# Patient Record
Sex: FEMALE | Race: WHITE
[De-identification: names, ages, dates, MRNs, and addresses within clinical notes are randomized per-mention and may not be internally consistent; named-entity substitution may affect disease eponyms.]

---

## 2020-06-04 ENCOUNTER — ERX REFILL RESPONSE (OUTPATIENT)
Age: 80
End: 2020-06-04

## 2020-06-04 RX ORDER — SUCRALFATE 1 G/1
TAKE ONE TABLET BY MOUTH EVERY NIGHT AT BEDTIME TABLET ORAL
Qty: 90 | Refills: 0

## 2020-06-24 ENCOUNTER — OFFICE VISIT (OUTPATIENT)
Dept: URBAN - METROPOLITAN AREA CLINIC 115 | Facility: CLINIC | Age: 80
End: 2020-06-24

## 2020-07-17 ENCOUNTER — TELEPHONE ENCOUNTER (OUTPATIENT)
Dept: URBAN - METROPOLITAN AREA CLINIC 92 | Facility: CLINIC | Age: 80
End: 2020-07-17

## 2020-07-17 RX ORDER — OMEPRAZOLE 40 MG/1
1 CAPSULE 30 MINUTES BEFORE MORNING MEAL CAPSULE, DELAYED RELEASE ORAL ONCE A DAY
Qty: 90 | Refills: 1 | OUTPATIENT
Start: 2020-07-17

## 2020-09-08 ENCOUNTER — ERX REFILL RESPONSE (OUTPATIENT)
Age: 80
End: 2020-09-08

## 2020-09-08 RX ORDER — SUCRALFATE 1 G/1
TAKE ONE TABLET BY MOUTH EVERY NIGHT AT BEDTIME TABLET ORAL
Qty: 90 | Refills: 0

## 2020-09-14 ENCOUNTER — OFFICE VISIT (OUTPATIENT)
Dept: URBAN - METROPOLITAN AREA CLINIC 115 | Facility: CLINIC | Age: 80
End: 2020-09-14

## 2020-10-26 ENCOUNTER — OFFICE VISIT (OUTPATIENT)
Dept: URBAN - METROPOLITAN AREA CLINIC 115 | Facility: CLINIC | Age: 80
End: 2020-10-26

## 2020-10-29 ENCOUNTER — OFFICE VISIT (OUTPATIENT)
Dept: URBAN - METROPOLITAN AREA CLINIC 115 | Facility: CLINIC | Age: 80
End: 2020-10-29
Payer: MEDICARE

## 2020-10-29 ENCOUNTER — WEB ENCOUNTER (OUTPATIENT)
Dept: URBAN - METROPOLITAN AREA CLINIC 115 | Facility: CLINIC | Age: 80
End: 2020-10-29

## 2020-10-29 DIAGNOSIS — R13.10 DYSPHAGIA: ICD-10-CM

## 2020-10-29 DIAGNOSIS — K31.89 INTESTINAL METAPLASIA OF GASTRIC MUCOSA: ICD-10-CM

## 2020-10-29 DIAGNOSIS — K21.9 GERD (GASTROESOPHAGEAL REFLUX DISEASE): ICD-10-CM

## 2020-10-29 DIAGNOSIS — R49.0 HOARSENESS OF VOICE: ICD-10-CM

## 2020-10-29 DIAGNOSIS — Z86.010 PERSONAL HISTORY OF COLON POLYPS: ICD-10-CM

## 2020-10-29 DIAGNOSIS — K59.00 CONSTIPATION: ICD-10-CM

## 2020-10-29 PROCEDURE — G8427 DOCREV CUR MEDS BY ELIG CLIN: HCPCS | Performed by: INTERNAL MEDICINE

## 2020-10-29 PROCEDURE — 99214 OFFICE O/P EST MOD 30 MIN: CPT | Performed by: INTERNAL MEDICINE

## 2020-10-29 PROCEDURE — G8418 CALC BMI BLW LOW PARAM F/U: HCPCS | Performed by: INTERNAL MEDICINE

## 2020-10-29 PROCEDURE — 1036F TOBACCO NON-USER: CPT | Performed by: INTERNAL MEDICINE

## 2020-10-29 RX ORDER — SUCRALFATE 1 G/1
TAKE ONE TABLET BY MOUTH EVERY NIGHT AT BEDTIME TABLET ORAL
Qty: 90 | Refills: 0 | Status: ACTIVE | COMMUNITY

## 2020-10-29 RX ORDER — TRAMADOL HYDROCHLORIDE 50 MG/1
TABLET ORAL
Qty: 0 | Refills: 0 | Status: ACTIVE | COMMUNITY
Start: 1900-01-01

## 2020-10-29 RX ORDER — ACETAMINOPHEN 325 MG/1
TAKE 1 TABLET (325 MG) BY ORAL ROUTE EVERY 4 HOURS AS NEEDED TABLET, FILM COATED ORAL
Qty: 0 | Refills: 0 | Status: ACTIVE | COMMUNITY
Start: 1900-01-01

## 2020-10-29 RX ORDER — ATORVASTATIN CALCIUM 10 MG/1
TAKE 1 TABLET (10 MG) BY ORAL ROUTE ONCE DAILY TABLET, FILM COATED ORAL 1
Qty: 0 | Refills: 0 | Status: ACTIVE | COMMUNITY
Start: 1900-01-01

## 2020-10-29 RX ORDER — OMEPRAZOLE 40 MG/1
CAPSULE, DELAYED RELEASE PELLETS ORAL
Qty: 0 | Refills: 0 | Status: ACTIVE | COMMUNITY
Start: 1900-01-01

## 2020-10-29 RX ORDER — ACETAMINOPHEN ER 650 MG TABLET,EXTENDED RELEASE 650 MG
TABLET, EXTENDED RELEASE ORAL
Qty: 0 | Refills: 0 | Status: ACTIVE | COMMUNITY
Start: 1900-01-01

## 2020-10-29 RX ORDER — OMEPRAZOLE 40 MG/1
1 CAPSULE 30 MINUTES BEFORE MORNING MEAL CAPSULE, DELAYED RELEASE ORAL ONCE A DAY
Qty: 90 | Refills: 1 | Status: ACTIVE | COMMUNITY
Start: 2020-07-17

## 2020-10-29 NOTE — HPI-TODAY'S VISIT:
10/29/2020 Patient presents for a follow up office visit. Patient states she has been doing better, with occasional symptoms of GERD. She has intermittent symptoms of nausea in the morning. She did not undergo ENT evaluation of vocal cords. Patient states she has been avoiding chocolate. She is due for an EGD for surveillance of metaplasia.

## 2020-10-29 NOTE — PHYSICAL EXAM HENT:
Head, normocephalic, atraumatic, Face, Face within normal limits, Ears, External ears within normal limits, Nose/Nasopharynx, External nose  normal appearance, nares patent, no nasal discharge, Mouth and Throat, Oral cavity appearance normal, Breath odor normal, Lips, Appearance normal
SEA Alvarez RN

## 2020-10-29 NOTE — HPI-OTHER HISTORIES
This is a scheduled follow-up appointment for this patient, a 79 year old /White female, after a previous visit approximately 8 months ago, for a follow up of colon polyps. The patient is due for repeat colonoscopy for polyp surveillance.  There is no recent history of rectal bleeding. The patient has no pertinent additional complaints of abdominal pain, constipation, diarrhea, and weight loss. The patient had a colonoscopy which was performed 5 months ago. The colonoscopy showed colon polyps and colon polyps and 2.5 cm distal rectal polyp, further removed by Dr. Zavaleta 6/1/2015.. The polyps were located in the rectum. 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  The patient has had the following imaging: no recent imaging has been performed.     08/16/2017 Patient complains of dysphagia with food getting stuck in her chest and nausea. She is currently on Nexium and Alkaseltzer. She also complains of bloating and occasional constipation for which she takes Dulcolax prn. Denies NSAID use. Her last colonoscopy was in 2015 with a large 25mm polyp at the time which needed surgical intervention for removal at the time.  09/14/2017 EGD done on 09/01/17 showed esophagitis, schatzki's ring and gastritis. Status post dilatation. No H. pyori noted. Colonoscopy done on 09/01/17 showed diverticulosis. No polyps noted. She complains of acid reflux with occasional nausea for which she takes Nexium. Constipation improved.  12/05/2018 Patient does not feel that food digests well, admits to regurgitation. States she's currently on Nexium. Patient takes Zantac at night for symptoms of nausea. States symptoms are intermittent, Admits to excessive belching. Admits to bloating and nausea, symptoms improved with Zantac.  10/30/2019 Patient states she had a flare up of nausea, vomiting, low grade fever 3 weeks ago, for no reason. She states symptoms lasted for the day and the next day she was fine. She denies any constipation. She complains of occasional dysphagia for solids, with pasta or rice. She is currently on Nexium. She complains of excessive hiccups. She denies any NSAIDS use. She is currently only taking tylenol for arthritis. Patient states she takes vitamin D supplement. No weight loss.  02/12/2020 EGD done on 11/22/2019 showed gastritis, esophagitis and widely patemt Schatzki's ring. Biopsies were benign, showed intestinal metaplasia in the gastric antrum, no H pylori bacteria. Patient complains of hoarseness of voice. She states she sees an ENT physician to have her ears cleaned, but has not seen her for symptoms of hoarseness. She does not eat late at night. She has occasional glass of wine at night. She is currently on omeprazole. She does report feeling better on omeprazole. Previous colonoscopy done in 2017, showed no polyps. Denies any melena or hematochezia.

## 2020-10-30 ENCOUNTER — TELEPHONE ENCOUNTER (OUTPATIENT)
Dept: URBAN - METROPOLITAN AREA CLINIC 115 | Facility: CLINIC | Age: 80
End: 2020-10-30

## 2020-12-07 ENCOUNTER — OFFICE VISIT (OUTPATIENT)
Dept: URBAN - METROPOLITAN AREA SURGERY CENTER 13 | Facility: SURGERY CENTER | Age: 80
End: 2020-12-07
Payer: MEDICARE

## 2020-12-07 ENCOUNTER — CLAIMS CREATED FROM THE CLAIM WINDOW (OUTPATIENT)
Dept: URBAN - METROPOLITAN AREA CLINIC 4 | Facility: CLINIC | Age: 80
End: 2020-12-07
Payer: MEDICARE

## 2020-12-07 DIAGNOSIS — T47.8X5A ADVERSE EFFECT OF OTHER AGENTS PRIMARILY AFFECTING GASTROINTESTINAL SYSTEM, INITIAL ENCOUNTER: ICD-10-CM

## 2020-12-07 DIAGNOSIS — K29.60 OTHER GASTRITIS WITHOUT BLEEDING: ICD-10-CM

## 2020-12-07 DIAGNOSIS — K31.89 ACQUIRED DEFORMITY OF DUODENUM: ICD-10-CM

## 2020-12-07 DIAGNOSIS — R13.19 CERVICAL DYSPHAGIA: ICD-10-CM

## 2020-12-07 DIAGNOSIS — K21.9 ACID REFLUX: ICD-10-CM

## 2020-12-07 DIAGNOSIS — R10.13 ABDOMINAL DISCOMFORT, EPIGASTRIC: ICD-10-CM

## 2020-12-07 DIAGNOSIS — K22.2 ACQUIRED ESOPHAGEAL RING: ICD-10-CM

## 2020-12-07 DIAGNOSIS — K21.00 GASTRO-ESOPHAGEAL REFLUX DISEASE WITH ESOPHAGITIS, WITHOUT BLEEDING: ICD-10-CM

## 2020-12-07 PROCEDURE — 43248 EGD GUIDE WIRE INSERTION: CPT | Performed by: INTERNAL MEDICINE

## 2020-12-07 PROCEDURE — 88312 SPECIAL STAINS GROUP 1: CPT | Performed by: PATHOLOGY

## 2020-12-07 PROCEDURE — 43239 EGD BIOPSY SINGLE/MULTIPLE: CPT | Performed by: INTERNAL MEDICINE

## 2020-12-07 PROCEDURE — G8907 PT DOC NO EVENTS ON DISCHARG: HCPCS | Performed by: INTERNAL MEDICINE

## 2020-12-07 PROCEDURE — 88305 TISSUE EXAM BY PATHOLOGIST: CPT | Performed by: PATHOLOGY

## 2020-12-07 RX ORDER — ACETAMINOPHEN ER 650 MG TABLET,EXTENDED RELEASE 650 MG
TABLET, EXTENDED RELEASE ORAL
Qty: 0 | Refills: 0 | Status: ACTIVE | COMMUNITY
Start: 1900-01-01

## 2020-12-07 RX ORDER — ATORVASTATIN CALCIUM 10 MG/1
TAKE 1 TABLET (10 MG) BY ORAL ROUTE ONCE DAILY TABLET, FILM COATED ORAL 1
Qty: 0 | Refills: 0 | Status: ACTIVE | COMMUNITY
Start: 1900-01-01

## 2020-12-07 RX ORDER — OMEPRAZOLE 40 MG/1
CAPSULE, DELAYED RELEASE PELLETS ORAL
Qty: 0 | Refills: 0 | Status: ACTIVE | COMMUNITY
Start: 1900-01-01

## 2020-12-07 RX ORDER — OMEPRAZOLE 40 MG/1
1 CAPSULE 30 MINUTES BEFORE MORNING MEAL CAPSULE, DELAYED RELEASE ORAL ONCE A DAY
Qty: 90 | Refills: 1 | Status: ACTIVE | COMMUNITY
Start: 2020-07-17

## 2020-12-07 RX ORDER — ACETAMINOPHEN 325 MG/1
TAKE 1 TABLET (325 MG) BY ORAL ROUTE EVERY 4 HOURS AS NEEDED TABLET, FILM COATED ORAL
Qty: 0 | Refills: 0 | Status: ACTIVE | COMMUNITY
Start: 1900-01-01

## 2020-12-07 RX ORDER — SUCRALFATE 1 G/1
TAKE ONE TABLET BY MOUTH EVERY NIGHT AT BEDTIME TABLET ORAL
Qty: 90 | Refills: 0 | Status: ACTIVE | COMMUNITY

## 2020-12-07 RX ORDER — TRAMADOL HYDROCHLORIDE 50 MG/1
TABLET ORAL
Qty: 0 | Refills: 0 | Status: ACTIVE | COMMUNITY
Start: 1900-01-01

## 2020-12-08 ENCOUNTER — TELEPHONE ENCOUNTER (OUTPATIENT)
Dept: URBAN - METROPOLITAN AREA CLINIC 82 | Facility: CLINIC | Age: 80
End: 2020-12-08

## 2020-12-17 ENCOUNTER — OFFICE VISIT (OUTPATIENT)
Dept: URBAN - METROPOLITAN AREA CLINIC 115 | Facility: CLINIC | Age: 80
End: 2020-12-17
Payer: MEDICARE

## 2020-12-17 DIAGNOSIS — K31.89 INTESTINAL METAPLASIA OF GASTRIC MUCOSA: ICD-10-CM

## 2020-12-17 DIAGNOSIS — Z86.010 PERSONAL HISTORY OF COLON POLYPS: ICD-10-CM

## 2020-12-17 DIAGNOSIS — R49.0 HOARSENESS OF VOICE: ICD-10-CM

## 2020-12-17 DIAGNOSIS — R13.10 DYSPHAGIA: ICD-10-CM

## 2020-12-17 DIAGNOSIS — K21.9 GERD (GASTROESOPHAGEAL REFLUX DISEASE): ICD-10-CM

## 2020-12-17 DIAGNOSIS — K59.00 CONSTIPATION: ICD-10-CM

## 2020-12-17 PROCEDURE — G8418 CALC BMI BLW LOW PARAM F/U: HCPCS | Performed by: INTERNAL MEDICINE

## 2020-12-17 PROCEDURE — 99214 OFFICE O/P EST MOD 30 MIN: CPT | Performed by: INTERNAL MEDICINE

## 2020-12-17 PROCEDURE — G9903 PT SCRN TBCO ID AS NON USER: HCPCS | Performed by: INTERNAL MEDICINE

## 2020-12-17 PROCEDURE — G8427 DOCREV CUR MEDS BY ELIG CLIN: HCPCS | Performed by: INTERNAL MEDICINE

## 2020-12-17 RX ORDER — ATORVASTATIN CALCIUM 10 MG/1
TAKE 1 TABLET (10 MG) BY ORAL ROUTE ONCE DAILY TABLET, FILM COATED ORAL 1
Qty: 0 | Refills: 0 | COMMUNITY
Start: 1900-01-01

## 2020-12-17 RX ORDER — OMEPRAZOLE 40 MG/1
1 CAPSULE 30 MINUTES BEFORE MORNING MEAL CAPSULE, DELAYED RELEASE ORAL ONCE A DAY
Qty: 90 | Refills: 1 | COMMUNITY
Start: 2020-07-17

## 2020-12-17 RX ORDER — OMEPRAZOLE 40 MG/1
CAPSULE, DELAYED RELEASE PELLETS ORAL
Qty: 0 | Refills: 0 | COMMUNITY
Start: 1900-01-01

## 2020-12-17 RX ORDER — TRAMADOL HYDROCHLORIDE 50 MG/1
TABLET ORAL
Qty: 0 | Refills: 0 | COMMUNITY
Start: 1900-01-01

## 2020-12-17 RX ORDER — ACETAMINOPHEN 325 MG/1
TAKE 1 TABLET (325 MG) BY ORAL ROUTE EVERY 4 HOURS AS NEEDED TABLET, FILM COATED ORAL
Qty: 0 | Refills: 0 | COMMUNITY
Start: 1900-01-01

## 2020-12-17 RX ORDER — SUCRALFATE 1 G
1 TABLET ON AN EMPTY STOMACH TABLET ORAL TWICE A DAY
Qty: 180 TABLET | Refills: 1 | OUTPATIENT
Start: 2020-12-17 | End: 2021-06-15

## 2020-12-17 RX ORDER — ACETAMINOPHEN ER 650 MG TABLET,EXTENDED RELEASE 650 MG
TABLET, EXTENDED RELEASE ORAL
Qty: 0 | Refills: 0 | COMMUNITY
Start: 1900-01-01

## 2020-12-17 RX ORDER — OMEPRAZOLE 40 MG/1
1 CAPSULE 30 MINUTES BEFORE MORNING MEAL CAPSULE, DELAYED RELEASE ORAL ONCE A DAY
Qty: 90 | Refills: 1 | OUTPATIENT
Start: 2020-12-17

## 2020-12-17 RX ORDER — SUCRALFATE 1 G/1
TAKE ONE TABLET BY MOUTH EVERY NIGHT AT BEDTIME TABLET ORAL
Qty: 90 | Refills: 0 | COMMUNITY

## 2020-12-17 NOTE — HPI-TODAY'S VISIT:
10/29/2020 Patient presents for a follow up office visit. Patient states she has been doing better, with occasional symptoms of GERD. She has intermittent symptoms of nausea in the morning. She did not undergo ENT evaluation of vocal cords. Patient states she has been avoiding chocolate. She is due for an EGD for surveillance of metaplasia.  12/17/2020 Patient presents for a follow up office visit. EGD done on 12/7/2020 showed gastritis, esophagitis, and Schaztki's ring s/p dilatation performed. BIopsy showed gastropathy no metaplasia and no H pylori, no Barretts noted.  Patient states she is doing well. She states after dilatation she was sore for about 3 days, but she no longer has any issues with dysphagia. She did see the ENT physician, no pathology noted. She continues to take omeprazole and carafate.

## 2021-01-07 ENCOUNTER — OFFICE VISIT (OUTPATIENT)
Dept: URBAN - METROPOLITAN AREA CLINIC 115 | Facility: CLINIC | Age: 81
End: 2021-01-07

## 2021-02-19 ENCOUNTER — TELEPHONE ENCOUNTER (OUTPATIENT)
Dept: URBAN - METROPOLITAN AREA CLINIC 23 | Facility: CLINIC | Age: 81
End: 2021-02-19

## 2021-06-24 ENCOUNTER — OFFICE VISIT (OUTPATIENT)
Dept: URBAN - METROPOLITAN AREA CLINIC 115 | Facility: CLINIC | Age: 81
End: 2021-06-24
Payer: MEDICARE

## 2021-06-24 ENCOUNTER — WEB ENCOUNTER (OUTPATIENT)
Dept: URBAN - METROPOLITAN AREA CLINIC 115 | Facility: CLINIC | Age: 81
End: 2021-06-24

## 2021-06-24 ENCOUNTER — LAB OUTSIDE AN ENCOUNTER (OUTPATIENT)
Dept: URBAN - METROPOLITAN AREA CLINIC 115 | Facility: CLINIC | Age: 81
End: 2021-06-24

## 2021-06-24 DIAGNOSIS — R13.10 DYSPHAGIA: ICD-10-CM

## 2021-06-24 DIAGNOSIS — K21.9 GERD (GASTROESOPHAGEAL REFLUX DISEASE): ICD-10-CM

## 2021-06-24 DIAGNOSIS — R49.0 HOARSENESS OF VOICE: ICD-10-CM

## 2021-06-24 DIAGNOSIS — K59.00 CONSTIPATION: ICD-10-CM

## 2021-06-24 DIAGNOSIS — Z86.010 PERSONAL HISTORY OF COLON POLYPS: ICD-10-CM

## 2021-06-24 DIAGNOSIS — K31.89 INTESTINAL METAPLASIA OF GASTRIC MUCOSA: ICD-10-CM

## 2021-06-24 PROBLEM — 428283002 HISTORY OF POLYP OF COLON: Status: ACTIVE | Noted: 2020-10-29

## 2021-06-24 PROBLEM — 14760008 CONSTIPATION: Status: ACTIVE | Noted: 2020-10-29

## 2021-06-24 PROBLEM — 50219008 VOICE HOARSENESS: Status: ACTIVE | Noted: 2020-10-29

## 2021-06-24 PROCEDURE — G8427 DOCREV CUR MEDS BY ELIG CLIN: HCPCS | Performed by: INTERNAL MEDICINE

## 2021-06-24 PROCEDURE — G8420 CALC BMI NORM PARAMETERS: HCPCS | Performed by: INTERNAL MEDICINE

## 2021-06-24 PROCEDURE — 99214 OFFICE O/P EST MOD 30 MIN: CPT | Performed by: INTERNAL MEDICINE

## 2021-06-24 PROCEDURE — G9903 PT SCRN TBCO ID AS NON USER: HCPCS | Performed by: INTERNAL MEDICINE

## 2021-06-24 RX ORDER — TRAMADOL HYDROCHLORIDE 50 MG/1
TABLET ORAL
Qty: 0 | Refills: 0 | Status: ACTIVE | COMMUNITY
Start: 1900-01-01

## 2021-06-24 RX ORDER — SUCRALFATE 1 G/1
TAKE ONE TABLET BY MOUTH EVERY NIGHT AT BEDTIME TABLET ORAL
Qty: 90 | Refills: 0 | Status: ACTIVE | COMMUNITY

## 2021-06-24 RX ORDER — ACETAMINOPHEN 325 MG/1
TAKE 1 TABLET (325 MG) BY ORAL ROUTE EVERY 4 HOURS AS NEEDED TABLET, FILM COATED ORAL
Qty: 0 | Refills: 0 | Status: ACTIVE | COMMUNITY
Start: 1900-01-01

## 2021-06-24 RX ORDER — ACETAMINOPHEN ER 650 MG TABLET,EXTENDED RELEASE 650 MG
TABLET, EXTENDED RELEASE ORAL
Qty: 0 | Refills: 0 | Status: ACTIVE | COMMUNITY
Start: 1900-01-01

## 2021-06-24 RX ORDER — ATORVASTATIN CALCIUM 10 MG/1
TAKE 1 TABLET (10 MG) BY ORAL ROUTE ONCE DAILY TABLET, FILM COATED ORAL 1
Qty: 0 | Refills: 0 | Status: ACTIVE | COMMUNITY
Start: 1900-01-01

## 2021-06-24 RX ORDER — OMEPRAZOLE 40 MG/1
CAPSULE, DELAYED RELEASE PELLETS ORAL
Qty: 0 | Refills: 0 | Status: ACTIVE | COMMUNITY
Start: 1900-01-01

## 2021-06-24 RX ORDER — OMEPRAZOLE 40 MG/1
1 CAPSULE 30 MINUTES BEFORE MORNING MEAL CAPSULE, DELAYED RELEASE ORAL ONCE A DAY
Qty: 90 | Refills: 1 | Status: ACTIVE | COMMUNITY
Start: 2020-12-17

## 2021-06-24 RX ORDER — OMEPRAZOLE 40 MG/1
1 CAPSULE 30 MINUTES BEFORE MORNING MEAL CAPSULE, DELAYED RELEASE ORAL ONCE A DAY
Qty: 90 | Refills: 1 | Status: ACTIVE | COMMUNITY
Start: 2020-07-17

## 2021-06-24 NOTE — HPI-TODAY'S VISIT:
10/29/2020 Patient presents for a follow up office visit. Patient states she has been doing better, with occasional symptoms of GERD. She has intermittent symptoms of nausea in the morning. She did not undergo ENT evaluation of vocal cords. Patient states she has been avoiding chocolate. She is due for an EGD for surveillance of metaplasia.  12/17/2020 Patient presents for a follow up office visit. EGD done on 12/7/2020 showed gastritis, esophagitis, and Schaztki's ring s/p dilatation performed. Biopsy showed gastropathy no metaplasia and no H pylori, no Barretts noted.  Patient states she is doing well. She states after dilatation she was sore for about 3 days, but she no longer has any issues with dysphagia. She did see the ENT physician, no pathology noted. She continues to take omeprazole and carafate.  06/24/2021 Patient presents for a follow up office visit. Her acid reflux has improved with only occasional symptoms depending on her diet. Rice and spaghetti worsen her reflux and dysphagia. She currently takes omeprazole 40mg (in the morning) and carafate (twice a day). She had EGD with dilation done last year 12/2020. Overall she has been feeling well. Patient is fully vaccinated for Covid.

## 2021-07-26 ENCOUNTER — OFFICE VISIT (OUTPATIENT)
Dept: URBAN - METROPOLITAN AREA SURGERY CENTER 13 | Facility: SURGERY CENTER | Age: 81
End: 2021-07-26

## 2021-09-17 ENCOUNTER — TELEPHONE ENCOUNTER (OUTPATIENT)
Dept: URBAN - METROPOLITAN AREA CLINIC 115 | Facility: CLINIC | Age: 81
End: 2021-09-17

## 2021-09-17 RX ORDER — OMEPRAZOLE 40 MG/1
1 CAPSULE 30 MINUTES BEFORE MORNING MEAL CAPSULE, DELAYED RELEASE ORAL ONCE A DAY
Qty: 90 | Refills: 0
Start: 2020-12-17

## 2021-12-17 ENCOUNTER — TELEPHONE ENCOUNTER (OUTPATIENT)
Dept: URBAN - METROPOLITAN AREA CLINIC 115 | Facility: CLINIC | Age: 81
End: 2021-12-17

## 2021-12-17 RX ORDER — OMEPRAZOLE 40 MG/1
1 CAPSULE 30 MINUTES BEFORE MORNING MEAL CAPSULE, DELAYED RELEASE ORAL ONCE A DAY
Qty: 90 | Refills: 0
Start: 2020-12-17

## 2021-12-23 ENCOUNTER — OFFICE VISIT (OUTPATIENT)
Dept: URBAN - METROPOLITAN AREA CLINIC 115 | Facility: CLINIC | Age: 81
End: 2021-12-23

## 2022-06-16 ENCOUNTER — CLAIMS CREATED FROM THE CLAIM WINDOW (OUTPATIENT)
Dept: URBAN - METROPOLITAN AREA CLINIC 115 | Facility: CLINIC | Age: 82
End: 2022-06-16
Payer: MEDICARE

## 2022-06-16 ENCOUNTER — WEB ENCOUNTER (OUTPATIENT)
Dept: URBAN - METROPOLITAN AREA CLINIC 115 | Facility: CLINIC | Age: 82
End: 2022-06-16

## 2022-06-16 VITALS
DIASTOLIC BLOOD PRESSURE: 70 MMHG | BODY MASS INDEX: 21.16 KG/M2 | HEIGHT: 65 IN | HEART RATE: 62 BPM | RESPIRATION RATE: 18 BRPM | SYSTOLIC BLOOD PRESSURE: 129 MMHG | TEMPERATURE: 97.1 F | WEIGHT: 127 LBS

## 2022-06-16 DIAGNOSIS — K58.0 IRRITABLE BOWEL SYNDROME WITH DIARRHEA: ICD-10-CM

## 2022-06-16 DIAGNOSIS — K21.9 GERD (GASTROESOPHAGEAL REFLUX DISEASE): ICD-10-CM

## 2022-06-16 PROCEDURE — G9903 PT SCRN TBCO ID AS NON USER: HCPCS | Performed by: INTERNAL MEDICINE

## 2022-06-16 PROCEDURE — G8427 DOCREV CUR MEDS BY ELIG CLIN: HCPCS | Performed by: INTERNAL MEDICINE

## 2022-06-16 PROCEDURE — G8418 CALC BMI BLW LOW PARAM F/U: HCPCS | Performed by: INTERNAL MEDICINE

## 2022-06-16 PROCEDURE — 99214 OFFICE O/P EST MOD 30 MIN: CPT | Performed by: INTERNAL MEDICINE

## 2022-06-16 RX ORDER — CHOLESTYRAMINE 4 G/9G
1 PACKET MIXED WITH WATER OR NON-CARBONATED DRINK POWDER, FOR SUSPENSION ORAL TWICE A DAY
Qty: 60 | Refills: 1 | OUTPATIENT
Start: 2022-06-16

## 2022-06-16 RX ORDER — OMEPRAZOLE 40 MG/1
1 CAPSULE 30 MINUTES BEFORE MORNING MEAL CAPSULE, DELAYED RELEASE ORAL ONCE A DAY
Qty: 90 | Refills: 1 | Status: ACTIVE | COMMUNITY
Start: 2020-07-17

## 2022-06-16 RX ORDER — OMEPRAZOLE 40 MG/1
CAPSULE, DELAYED RELEASE PELLETS ORAL
Qty: 0 | Refills: 0 | Status: ACTIVE | COMMUNITY
Start: 1900-01-01

## 2022-06-16 RX ORDER — POLYETHYLENE GLYCOL-3350 AND ELECTROLYTES WITH FLAVOR PACK 240; 5.84; 2.98; 6.72; 22.72 G/278.26G; G/278.26G; G/278.26G; G/278.26G; G/278.26G
AS DIRECTED POWDER, FOR SOLUTION ORAL
Qty: 1 | Refills: 0 | OUTPATIENT
Start: 2022-06-16 | End: 2022-06-17

## 2022-06-16 RX ORDER — OMEPRAZOLE 40 MG/1
1 CAPSULE 30 MINUTES BEFORE MORNING MEAL CAPSULE, DELAYED RELEASE ORAL ONCE A DAY
Qty: 90 | Refills: 1 | OUTPATIENT
Start: 2022-06-16

## 2022-06-16 RX ORDER — TRAMADOL HYDROCHLORIDE 50 MG/1
TABLET ORAL
Qty: 0 | Refills: 0 | Status: ACTIVE | COMMUNITY
Start: 1900-01-01

## 2022-06-16 RX ORDER — ACETAMINOPHEN ER 650 MG TABLET,EXTENDED RELEASE 650 MG
TABLET, EXTENDED RELEASE ORAL
Qty: 0 | Refills: 0 | Status: ACTIVE | COMMUNITY
Start: 1900-01-01

## 2022-06-16 RX ORDER — SUCRALFATE 1 G/1
TAKE ONE TABLET BY MOUTH EVERY NIGHT AT BEDTIME TABLET ORAL
Qty: 90 | Refills: 0 | Status: ACTIVE | COMMUNITY

## 2022-06-16 RX ORDER — OMEPRAZOLE 40 MG/1
1 CAPSULE 30 MINUTES BEFORE MORNING MEAL CAPSULE, DELAYED RELEASE ORAL ONCE A DAY
Qty: 90 | Refills: 0 | Status: ACTIVE | COMMUNITY
Start: 2020-12-17

## 2022-06-16 RX ORDER — ATORVASTATIN CALCIUM 10 MG/1
TAKE 1 TABLET (10 MG) BY ORAL ROUTE ONCE DAILY TABLET, FILM COATED ORAL 1
Qty: 0 | Refills: 0 | Status: ACTIVE | COMMUNITY
Start: 1900-01-01

## 2022-06-16 RX ORDER — ACETAMINOPHEN 325 MG/1
TAKE 1 TABLET (325 MG) BY ORAL ROUTE EVERY 4 HOURS AS NEEDED TABLET, FILM COATED ORAL
Qty: 0 | Refills: 0 | Status: ACTIVE | COMMUNITY
Start: 1900-01-01

## 2022-06-16 NOTE — HPI-TODAY'S VISIT:
10/29/2020 Patient presents for a follow up office visit. Patient states she has been doing better, with occasional symptoms of GERD. She has intermittent symptoms of nausea in the morning. She did not undergo ENT evaluation of vocal cords. Patient states she has been avoiding chocolate. She is due for an EGD for surveillance of metaplasia.  12/17/2020 Patient presents for a follow up office visit. EGD done on 12/7/2020 showed gastritis, esophagitis, and Schaztki's ring s/p dilatation performed. Biopsy showed gastropathy no metaplasia and no H pylori, no Barretts noted.  Patient states she is doing well. She states after dilatation she was sore for about 3 days, but she no longer has any issues with dysphagia. She did see the ENT physician, no pathology noted. She continues to take omeprazole and carafate.  06/24/2021 Patient presents for a follow up office visit. Her acid reflux has improved with only occasional symptoms depending on her diet. Rice and spaghetti worsen her reflux and dysphagia. She currently takes omeprazole 40mg (in the morning) and carafate (twice a day). She had EGD with dilation done last year 12/2020. Overall she has been feeling well. Patient is fully vaccinated for Covid.  06/16/2022 Patient presents for symptoms of diarrhea and for a colon cancer screening. She states that she has been having three weeks of very bad diarrhea and she has been taking imodium and pepto bismol. Patient also states that she has a little bit of dark stool but no blood in stool. She states that she has been having a lot of gas and some bloating in the lower area of the stomach. Patient has been stressed due to  passing away and she takes melatonin to be able to sleep.

## 2022-06-17 ENCOUNTER — LAB OUTSIDE AN ENCOUNTER (OUTPATIENT)
Dept: URBAN - METROPOLITAN AREA CLINIC 115 | Facility: CLINIC | Age: 82
End: 2022-06-17

## 2022-06-17 ENCOUNTER — TELEPHONE ENCOUNTER (OUTPATIENT)
Dept: URBAN - METROPOLITAN AREA CLINIC 115 | Facility: CLINIC | Age: 82
End: 2022-06-17

## 2022-07-07 ENCOUNTER — OFFICE VISIT (OUTPATIENT)
Dept: URBAN - METROPOLITAN AREA SURGERY CENTER 13 | Facility: SURGERY CENTER | Age: 82
End: 2022-07-07

## 2022-07-14 ENCOUNTER — OFFICE VISIT (OUTPATIENT)
Dept: URBAN - METROPOLITAN AREA CLINIC 115 | Facility: CLINIC | Age: 82
End: 2022-07-14

## 2022-07-14 VITALS — HEIGHT: 65 IN

## 2022-07-14 RX ORDER — TRAMADOL HYDROCHLORIDE 50 MG/1
TABLET ORAL
Qty: 0 | Refills: 0 | Status: ACTIVE | COMMUNITY
Start: 1900-01-01

## 2022-07-14 RX ORDER — ACETAMINOPHEN 325 MG/1
TAKE 1 TABLET (325 MG) BY ORAL ROUTE EVERY 4 HOURS AS NEEDED TABLET, FILM COATED ORAL
Qty: 0 | Refills: 0 | Status: ACTIVE | COMMUNITY
Start: 1900-01-01

## 2022-07-14 RX ORDER — ACETAMINOPHEN ER 650 MG TABLET,EXTENDED RELEASE 650 MG
TABLET, EXTENDED RELEASE ORAL
Qty: 0 | Refills: 0 | Status: ACTIVE | COMMUNITY
Start: 1900-01-01

## 2022-07-14 RX ORDER — SUCRALFATE 1 G/1
TAKE ONE TABLET BY MOUTH EVERY NIGHT AT BEDTIME TABLET ORAL
Qty: 90 | Refills: 0 | Status: ACTIVE | COMMUNITY

## 2022-07-14 RX ORDER — OMEPRAZOLE 40 MG/1
CAPSULE, DELAYED RELEASE PELLETS ORAL
Qty: 0 | Refills: 0 | Status: ACTIVE | COMMUNITY
Start: 1900-01-01

## 2022-07-14 RX ORDER — OMEPRAZOLE 40 MG/1
1 CAPSULE 30 MINUTES BEFORE MORNING MEAL CAPSULE, DELAYED RELEASE ORAL ONCE A DAY
Qty: 90 | Refills: 0 | Status: ACTIVE | COMMUNITY
Start: 2020-12-17

## 2022-07-14 RX ORDER — OMEPRAZOLE 40 MG/1
1 CAPSULE 30 MINUTES BEFORE MORNING MEAL CAPSULE, DELAYED RELEASE ORAL ONCE A DAY
Qty: 90 | Refills: 1 | Status: ACTIVE | COMMUNITY
Start: 2020-07-17

## 2022-07-14 RX ORDER — ATORVASTATIN CALCIUM 10 MG/1
TAKE 1 TABLET (10 MG) BY ORAL ROUTE ONCE DAILY TABLET, FILM COATED ORAL 1
Qty: 0 | Refills: 0 | Status: ACTIVE | COMMUNITY
Start: 1900-01-01

## 2022-07-29 ENCOUNTER — OFFICE VISIT (OUTPATIENT)
Dept: URBAN - METROPOLITAN AREA SURGERY CENTER 13 | Facility: SURGERY CENTER | Age: 82
End: 2022-07-29

## 2022-08-12 ENCOUNTER — OFFICE VISIT (OUTPATIENT)
Dept: URBAN - METROPOLITAN AREA SURGERY CENTER 13 | Facility: SURGERY CENTER | Age: 82
End: 2022-08-12

## 2022-08-26 ENCOUNTER — OFFICE VISIT (OUTPATIENT)
Dept: URBAN - METROPOLITAN AREA SURGERY CENTER 13 | Facility: SURGERY CENTER | Age: 82
End: 2022-08-26
Payer: MEDICARE

## 2022-08-26 ENCOUNTER — CLAIMS CREATED FROM THE CLAIM WINDOW (OUTPATIENT)
Dept: URBAN - METROPOLITAN AREA CLINIC 4 | Facility: CLINIC | Age: 82
End: 2022-08-26
Payer: MEDICARE

## 2022-08-26 DIAGNOSIS — K63.89 OTHER SPECIFIED DISEASES OF INTESTINE: ICD-10-CM

## 2022-08-26 DIAGNOSIS — K29.70 GASTRITIS, UNSPECIFIED, WITHOUT BLEEDING: ICD-10-CM

## 2022-08-26 DIAGNOSIS — K52.832 COLITIS, UNSPEC (558.9): ICD-10-CM

## 2022-08-26 DIAGNOSIS — K52.832 LYMPHOCYTIC COLITIS: ICD-10-CM

## 2022-08-26 DIAGNOSIS — K31.89 ACQUIRED DEFORMITY OF DUODENUM: ICD-10-CM

## 2022-08-26 DIAGNOSIS — R10.13 ABDOMINAL DISCOMFORT, EPIGASTRIC: ICD-10-CM

## 2022-08-26 DIAGNOSIS — K22.89 DILATATION OF ESOPHAGUS: ICD-10-CM

## 2022-08-26 DIAGNOSIS — K62.1 ANAL AND RECTAL POLYP: ICD-10-CM

## 2022-08-26 DIAGNOSIS — K52.9 CHRONIC DIARRHEA: ICD-10-CM

## 2022-08-26 PROCEDURE — 43239 EGD BIOPSY SINGLE/MULTIPLE: CPT | Performed by: INTERNAL MEDICINE

## 2022-08-26 PROCEDURE — 88313 SPECIAL STAINS GROUP 2: CPT | Performed by: PATHOLOGY

## 2022-08-26 PROCEDURE — 88342 IMHCHEM/IMCYTCHM 1ST ANTB: CPT | Performed by: PATHOLOGY

## 2022-08-26 PROCEDURE — G8907 PT DOC NO EVENTS ON DISCHARG: HCPCS | Performed by: INTERNAL MEDICINE

## 2022-08-26 PROCEDURE — 88305 TISSUE EXAM BY PATHOLOGIST: CPT | Performed by: PATHOLOGY

## 2022-08-26 PROCEDURE — 45380 COLONOSCOPY AND BIOPSY: CPT | Performed by: INTERNAL MEDICINE

## 2022-08-26 PROCEDURE — 88312 SPECIAL STAINS GROUP 1: CPT | Performed by: PATHOLOGY

## 2022-08-29 ENCOUNTER — TELEPHONE ENCOUNTER (OUTPATIENT)
Dept: URBAN - METROPOLITAN AREA CLINIC 92 | Facility: CLINIC | Age: 82
End: 2022-08-29

## 2022-09-22 ENCOUNTER — OFFICE VISIT (OUTPATIENT)
Dept: URBAN - METROPOLITAN AREA CLINIC 115 | Facility: CLINIC | Age: 82
End: 2022-09-22
Payer: MEDICARE

## 2022-09-22 VITALS
WEIGHT: 126.4 LBS | DIASTOLIC BLOOD PRESSURE: 72 MMHG | HEIGHT: 65 IN | BODY MASS INDEX: 21.06 KG/M2 | TEMPERATURE: 97.1 F | RESPIRATION RATE: 18 BRPM | HEART RATE: 70 BPM | SYSTOLIC BLOOD PRESSURE: 137 MMHG

## 2022-09-22 DIAGNOSIS — K58.0 IRRITABLE BOWEL SYNDROME WITH DIARRHEA: ICD-10-CM

## 2022-09-22 DIAGNOSIS — K52.832 LYMPHOCYTIC COLITIS: ICD-10-CM

## 2022-09-22 DIAGNOSIS — K21.9 GERD (GASTROESOPHAGEAL REFLUX DISEASE): ICD-10-CM

## 2022-09-22 PROCEDURE — G9903 PT SCRN TBCO ID AS NON USER: HCPCS | Performed by: INTERNAL MEDICINE

## 2022-09-22 PROCEDURE — G8418 CALC BMI BLW LOW PARAM F/U: HCPCS | Performed by: INTERNAL MEDICINE

## 2022-09-22 PROCEDURE — 99214 OFFICE O/P EST MOD 30 MIN: CPT | Performed by: INTERNAL MEDICINE

## 2022-09-22 PROCEDURE — G8427 DOCREV CUR MEDS BY ELIG CLIN: HCPCS | Performed by: INTERNAL MEDICINE

## 2022-09-22 RX ORDER — OMEPRAZOLE 40 MG/1
1 CAPSULE 30 MINUTES BEFORE MORNING MEAL CAPSULE, DELAYED RELEASE ORAL ONCE A DAY
Qty: 90 | Refills: 1 | Status: ACTIVE | COMMUNITY
Start: 2020-07-17

## 2022-09-22 RX ORDER — BUDESONIDE 3 MG/1
1 CAPSULES CAPSULE ORAL THREE TIMES A DAY
Qty: 270 CAPSULE | Refills: 0 | OUTPATIENT
Start: 2022-09-22 | End: 2022-12-20

## 2022-09-22 RX ORDER — TRAMADOL HYDROCHLORIDE 50 MG/1
TABLET ORAL
Qty: 0 | Refills: 0 | Status: ACTIVE | COMMUNITY
Start: 1900-01-01

## 2022-09-22 RX ORDER — ACETAMINOPHEN ER 650 MG TABLET,EXTENDED RELEASE 650 MG
TABLET, EXTENDED RELEASE ORAL
Qty: 0 | Refills: 0 | Status: ACTIVE | COMMUNITY
Start: 1900-01-01

## 2022-09-22 RX ORDER — ATORVASTATIN CALCIUM 10 MG/1
TAKE 1 TABLET (10 MG) BY ORAL ROUTE ONCE DAILY TABLET, FILM COATED ORAL 1
Qty: 0 | Refills: 0 | Status: ACTIVE | COMMUNITY
Start: 1900-01-01

## 2022-09-22 RX ORDER — CHOLESTYRAMINE 4 G/9G
1 PACKET MIXED WITH WATER OR NON-CARBONATED DRINK POWDER, FOR SUSPENSION ORAL TWICE A DAY
Qty: 60 | Refills: 1 | Status: ACTIVE | COMMUNITY
Start: 2022-06-16

## 2022-09-22 RX ORDER — ACETAMINOPHEN 325 MG/1
TAKE 1 TABLET (325 MG) BY ORAL ROUTE EVERY 4 HOURS AS NEEDED TABLET, FILM COATED ORAL
Qty: 0 | Refills: 0 | Status: ACTIVE | COMMUNITY
Start: 1900-01-01

## 2022-09-22 RX ORDER — OMEPRAZOLE 40 MG/1
CAPSULE, DELAYED RELEASE PELLETS ORAL
Qty: 0 | Refills: 0 | Status: ACTIVE | COMMUNITY
Start: 1900-01-01

## 2022-09-22 RX ORDER — OMEPRAZOLE 40 MG/1
1 CAPSULE 30 MINUTES BEFORE MORNING MEAL CAPSULE, DELAYED RELEASE ORAL ONCE A DAY
Qty: 90 | Refills: 1 | Status: ACTIVE | COMMUNITY
Start: 2022-06-16

## 2022-09-22 RX ORDER — SUCRALFATE 1 G/1
TAKE ONE TABLET BY MOUTH EVERY NIGHT AT BEDTIME TABLET ORAL
Qty: 90 | Refills: 0 | Status: ACTIVE | COMMUNITY

## 2022-09-22 RX ORDER — OMEPRAZOLE 40 MG/1
1 CAPSULE 30 MINUTES BEFORE MORNING MEAL CAPSULE, DELAYED RELEASE ORAL ONCE A DAY
Qty: 90 | Refills: 0 | Status: ACTIVE | COMMUNITY
Start: 2020-12-17

## 2022-09-22 NOTE — HPI-TODAY'S VISIT:
10/29/2020 Patient presents for a follow up office visit. Patient states she has been doing better, with occasional symptoms of GERD. She has intermittent symptoms of nausea in the morning. She did not undergo ENT evaluation of vocal cords. Patient states she has been avoiding chocolate. She is due for an EGD for surveillance of metaplasia.  12/17/2020 Patient presents for a follow up office visit. EGD done on 12/7/2020 showed gastritis, esophagitis, and Schaztki's ring s/p dilatation performed. Biopsy showed gastropathy no metaplasia and no H pylori, no Barretts noted.  Patient states she is doing well. She states after dilatation she was sore for about 3 days, but she no longer has any issues with dysphagia. She did see the ENT physician, no pathology noted. She continues to take omeprazole and carafate.  06/24/2021 Patient presents for a follow up office visit. Her acid reflux has improved with only occasional symptoms depending on her diet. Rice and spaghetti worsen her reflux and dysphagia. She currently takes omeprazole 40mg (in the morning) and carafate (twice a day). She had EGD with dilation done last year 12/2020. Overall she has been feeling well. Patient is fully vaccinated for Covid.  06/16/2022 Patient presents for symptoms of diarrhea and for a colon cancer screening. She states that she has been having three weeks of very bad diarrhea and she has been taking imodium and pepto bismol. Patient also states that she has a little bit of dark stool but no blood in stool. She states that she has been having a lot of gas and some bloating in the lower area of the stomach. Patient has been stressed due to  passing away and she takes melatonin to be able to sleep.  9/22/2022 Patient presents for a procedure follow up. Patient states that she is still having gas and loose bowels. She states that she has around 3 loose bowel movements in the morning. Patient states that she does not take pain medications. She states that she has issues with osteoporosis and that she has not gotten checked up for a couple of years now. She states that she finished questran and that it did not help her. She also takes omeprazole and cholesterol medicine. Patient denies taking NSAIDs.

## 2022-09-27 ENCOUNTER — TELEPHONE ENCOUNTER (OUTPATIENT)
Dept: URBAN - METROPOLITAN AREA CLINIC 115 | Facility: CLINIC | Age: 82
End: 2022-09-27

## 2022-12-15 ENCOUNTER — OFFICE VISIT (OUTPATIENT)
Dept: URBAN - METROPOLITAN AREA CLINIC 115 | Facility: CLINIC | Age: 82
End: 2022-12-15
Payer: MEDICARE

## 2022-12-15 VITALS
SYSTOLIC BLOOD PRESSURE: 141 MMHG | WEIGHT: 127.6 LBS | DIASTOLIC BLOOD PRESSURE: 72 MMHG | TEMPERATURE: 97.7 F | HEIGHT: 65 IN | HEART RATE: 72 BPM | BODY MASS INDEX: 21.26 KG/M2

## 2022-12-15 DIAGNOSIS — K21.9 GERD (GASTROESOPHAGEAL REFLUX DISEASE): ICD-10-CM

## 2022-12-15 DIAGNOSIS — K58.0 IRRITABLE BOWEL SYNDROME WITH DIARRHEA: ICD-10-CM

## 2022-12-15 DIAGNOSIS — K52.832 LYMPHOCYTIC COLITIS: ICD-10-CM

## 2022-12-15 PROCEDURE — G8427 DOCREV CUR MEDS BY ELIG CLIN: HCPCS | Performed by: INTERNAL MEDICINE

## 2022-12-15 PROCEDURE — G9903 PT SCRN TBCO ID AS NON USER: HCPCS | Performed by: INTERNAL MEDICINE

## 2022-12-15 PROCEDURE — 99214 OFFICE O/P EST MOD 30 MIN: CPT | Performed by: INTERNAL MEDICINE

## 2022-12-15 PROCEDURE — G8418 CALC BMI BLW LOW PARAM F/U: HCPCS | Performed by: INTERNAL MEDICINE

## 2022-12-15 RX ORDER — OMEPRAZOLE 40 MG/1
1 CAPSULE 30 MINUTES BEFORE MORNING MEAL CAPSULE, DELAYED RELEASE ORAL ONCE A DAY
Qty: 90 | Refills: 1 | Status: ACTIVE | COMMUNITY
Start: 2022-06-16

## 2022-12-15 RX ORDER — OMEPRAZOLE 40 MG/1
CAPSULE, DELAYED RELEASE PELLETS ORAL
Qty: 0 | Refills: 0 | Status: ACTIVE | COMMUNITY
Start: 1900-01-01

## 2022-12-15 RX ORDER — TRAMADOL HYDROCHLORIDE 50 MG/1
TABLET ORAL
Qty: 0 | Refills: 0 | Status: ACTIVE | COMMUNITY
Start: 1900-01-01

## 2022-12-15 RX ORDER — ATORVASTATIN CALCIUM 10 MG/1
TAKE 1 TABLET (10 MG) BY ORAL ROUTE ONCE DAILY TABLET, FILM COATED ORAL 1
Qty: 0 | Refills: 0 | Status: ACTIVE | COMMUNITY
Start: 1900-01-01

## 2022-12-15 RX ORDER — BUDESONIDE 3 MG/1
1 CAPSULES CAPSULE ORAL THREE TIMES A DAY
Qty: 270 CAPSULE | Refills: 0 | OUTPATIENT
Start: 2022-12-15 | End: 2023-03-15

## 2022-12-15 RX ORDER — OMEPRAZOLE 40 MG/1
1 CAPSULE 30 MINUTES BEFORE MORNING MEAL CAPSULE, DELAYED RELEASE ORAL ONCE A DAY
Qty: 90 | Refills: 1 | Status: ACTIVE | COMMUNITY
Start: 2020-07-17

## 2022-12-15 RX ORDER — ACETAMINOPHEN 325 MG/1
TAKE 1 TABLET (325 MG) BY ORAL ROUTE EVERY 4 HOURS AS NEEDED TABLET, FILM COATED ORAL
Qty: 0 | Refills: 0 | Status: ACTIVE | COMMUNITY
Start: 1900-01-01

## 2022-12-15 RX ORDER — ACETAMINOPHEN ER 650 MG TABLET,EXTENDED RELEASE 650 MG
TABLET, EXTENDED RELEASE ORAL
Qty: 0 | Refills: 0 | Status: ACTIVE | COMMUNITY
Start: 1900-01-01

## 2022-12-15 RX ORDER — SUCRALFATE 1 G/1
TAKE ONE TABLET BY MOUTH EVERY NIGHT AT BEDTIME TABLET ORAL
Qty: 90 | Refills: 0 | Status: ACTIVE | COMMUNITY

## 2022-12-15 RX ORDER — OMEPRAZOLE 40 MG/1
1 CAPSULE 30 MINUTES BEFORE MORNING MEAL CAPSULE, DELAYED RELEASE ORAL ONCE A DAY
Qty: 90 | Refills: 0 | Status: ACTIVE | COMMUNITY
Start: 2020-12-17

## 2022-12-15 RX ORDER — BUDESONIDE 3 MG/1
1 CAPSULES CAPSULE ORAL THREE TIMES A DAY
Qty: 270 CAPSULE | Refills: 0 | Status: ACTIVE | COMMUNITY
Start: 2022-09-22 | End: 2022-12-20

## 2022-12-15 RX ORDER — CHOLESTYRAMINE 4 G/9G
1 PACKET MIXED WITH WATER OR NON-CARBONATED DRINK POWDER, FOR SUSPENSION ORAL TWICE A DAY
Qty: 60 | Refills: 1 | Status: ACTIVE | COMMUNITY
Start: 2022-06-16

## 2022-12-15 NOTE — HPI-TODAY'S VISIT:
10/29/2020 Patient presents for a follow up office visit. Patient states she has been doing better, with occasional symptoms of GERD. She has intermittent symptoms of nausea in the morning. She did not undergo ENT evaluation of vocal cords. Patient states she has been avoiding chocolate. She is due for an EGD for surveillance of metaplasia.  12/17/2020 Patient presents for a follow up office visit. EGD done on 12/7/2020 showed gastritis, esophagitis, and Schaztki's ring s/p dilatation performed. Biopsy showed gastropathy no metaplasia and no H pylori, no Barretts noted.  Patient states she is doing well. She states after dilatation she was sore for about 3 days, but she no longer has any issues with dysphagia. She did see the ENT physician, no pathology noted. She continues to take omeprazole and carafate.  06/24/2021 Patient presents for a follow up office visit. Her acid reflux has improved with only occasional symptoms depending on her diet. Rice and spaghetti worsen her reflux and dysphagia. She currently takes omeprazole 40mg (in the morning) and carafate (twice a day). She had EGD with dilation done last year 12/2020. Overall she has been feeling well. Patient is fully vaccinated for Covid.  06/16/2022 Patient presents for symptoms of diarrhea and for a colon cancer screening. She states that she has been having three weeks of very bad diarrhea and she has been taking imodium and pepto bismol. Patient also states that she has a little bit of dark stool but no blood in stool. She states that she has been having a lot of gas and some bloating in the lower area of the stomach. Patient has been stressed due to  passing away and she takes melatonin to be able to sleep.  9/22/2022 Patient presents for a procedure follow up. Patient states that she is still having gas and loose bowels. She states that she has around 3 loose bowel movements in the morning. Patient states that she does not take pain medications. She states that she has issues with osteoporosis and that she has not gotten checked up for a couple of years now. She states that she finished questran and that it did not help her. She also takes omeprazole and cholesterol medicine. Patient denies taking NSAIDs.  12/15/2022 Patient presents for a follow up. Patient denies having loose bowels. She states that she is having an eye surgery and that she had to stop entocort for the procedure. She denies bloating, stomach pain, or acid reflux. She denies states that she does have arthritis and that she only takes tylenol. Patient states that she has been on dairy free diet. Patient states that Dr. Salvador Palacios did not order her bone density test.

## 2022-12-15 NOTE — HPI-OTHER HISTORIES
This is a scheduled follow-up appointment for this patient, a 79 year old /White female, after a previous visit approximately 8 months ago, for a follow up of colon polyps. The patient is due for repeat colonoscopy for polyp surveillance.  There is no recent history of rectal bleeding. The patient has no pertinent additional complaints of abdominal pain, constipation, diarrhea, and weight loss. The patient had a colonoscopy which was performed 5 months ago. The colonoscopy showed colon polyps and colon polyps and 2.5 cm distal rectal polyp, further removed by Dr. Zavaleta 6/1/2015.. The polyps were located in the rectum. 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  The patient has had the following imaging: no recent imaging has been performed.     08/16/2017 Patient complains of dysphagia with food getting stuck in her chest and nausea. She is currently on Nexium and Alkaseltzer. She also complains of bloating and occasional constipation for which she takes Dulcolax prn. Denies NSAID use. Her last colonoscopy was in 2015 with a large 25mm polyp at the time which needed surgical intervention for removal at the time.  09/14/2017 EGD done on 09/01/17 showed esophagitis, schatzki's ring and gastritis. Status post dilatation. No H. pyori noted. Colonoscopy done on 09/01/17 showed diverticulosis. No polyps noted. She complains of acid reflux with occasional nausea for which she takes Nexium. Constipation improved.  12/05/2018 Patient does not feel that food digests well, admits to regurgitation. States she's currently on Nexium. Patient takes Zantac at night for symptoms of nausea. States symptoms are intermittent, Admits to excessive belching. Admits to bloating and nausea, symptoms improved with Zantac.  10/30/2019 Patient states she had a flare up of nausea, vomiting, low grade fever 3 weeks ago, for no reason. She states symptoms lasted for the day and the next day she was fine. She denies any constipation. She complains of occasional dysphagia for solids, with pasta or rice. She is currently on Nexium. She complains of excessive hiccups. She denies any NSAIDS use. She is currently only taking tylenol for arthritis. Patient states she takes vitamin D supplement. No weight loss.  02/12/2020 EGD done on 11/22/2019 showed gastritis, esophagitis and widely patemt Schatzki's ring. Biopsies were benign, showed intestinal metaplasia in the gastric antrum, no H pylori bacteria. Patient complains of hoarseness of voice. She states she sees an ENT physician to have her ears cleaned, but has not seen her for symptoms of hoarseness. She does not eat late at night. She has occasional glass of wine at night. She is currently on omeprazole. She does report feeling better on omeprazole. Previous colonoscopy done in 2017, showed no polyps. Denies any melena or hematochezia. Detail Level: Zone Plan: Location: body\\n\\nDermatographism was drawn to detect histamine level. \\nPatient has high histamine level and advised to start taking Allegra BID, especially during allergy season.\\nF/u as needed

## 2022-12-22 ENCOUNTER — OFFICE VISIT (OUTPATIENT)
Dept: URBAN - METROPOLITAN AREA CLINIC 115 | Facility: CLINIC | Age: 82
End: 2022-12-22

## 2023-03-16 ENCOUNTER — OFFICE VISIT (OUTPATIENT)
Dept: URBAN - METROPOLITAN AREA CLINIC 115 | Facility: CLINIC | Age: 83
End: 2023-03-16
Payer: MEDICARE

## 2023-03-16 VITALS
HEART RATE: 85 BPM | HEIGHT: 65 IN | DIASTOLIC BLOOD PRESSURE: 76 MMHG | SYSTOLIC BLOOD PRESSURE: 149 MMHG | WEIGHT: 129.2 LBS | TEMPERATURE: 98.1 F | BODY MASS INDEX: 21.52 KG/M2

## 2023-03-16 DIAGNOSIS — K21.9 GERD (GASTROESOPHAGEAL REFLUX DISEASE): ICD-10-CM

## 2023-03-16 DIAGNOSIS — K52.832 LYMPHOCYTIC COLITIS: ICD-10-CM

## 2023-03-16 DIAGNOSIS — K58.0 IRRITABLE BOWEL SYNDROME WITH DIARRHEA: ICD-10-CM

## 2023-03-16 PROCEDURE — 99214 OFFICE O/P EST MOD 30 MIN: CPT | Performed by: INTERNAL MEDICINE

## 2023-03-16 PROCEDURE — G8418 CALC BMI BLW LOW PARAM F/U: HCPCS | Performed by: INTERNAL MEDICINE

## 2023-03-16 PROCEDURE — G8427 DOCREV CUR MEDS BY ELIG CLIN: HCPCS | Performed by: INTERNAL MEDICINE

## 2023-03-16 PROCEDURE — G9903 PT SCRN TBCO ID AS NON USER: HCPCS | Performed by: INTERNAL MEDICINE

## 2023-03-16 RX ORDER — OMEPRAZOLE 40 MG/1
1 CAPSULE 30 MINUTES BEFORE MORNING MEAL CAPSULE, DELAYED RELEASE ORAL ONCE A DAY
Qty: 90 | Refills: 1 | Status: ACTIVE | COMMUNITY
Start: 2020-07-17

## 2023-03-16 RX ORDER — ATORVASTATIN CALCIUM 10 MG/1
TAKE 1 TABLET (10 MG) BY ORAL ROUTE ONCE DAILY TABLET, FILM COATED ORAL 1
Qty: 0 | Refills: 0 | Status: ACTIVE | COMMUNITY
Start: 1900-01-01

## 2023-03-16 RX ORDER — CHOLESTYRAMINE 4 G/9G
1 PACKET MIXED WITH WATER OR NON-CARBONATED DRINK POWDER, FOR SUSPENSION ORAL TWICE A DAY
Qty: 60 | Refills: 1 | Status: ACTIVE | COMMUNITY
Start: 2022-06-16

## 2023-03-16 RX ORDER — TRAMADOL HYDROCHLORIDE 50 MG/1
TABLET ORAL
Qty: 0 | Refills: 0 | Status: ACTIVE | COMMUNITY
Start: 1900-01-01

## 2023-03-16 RX ORDER — ACETAMINOPHEN 325 MG/1
TAKE 1 TABLET (325 MG) BY ORAL ROUTE EVERY 4 HOURS AS NEEDED TABLET, FILM COATED ORAL
Qty: 0 | Refills: 0 | Status: ACTIVE | COMMUNITY
Start: 1900-01-01

## 2023-03-16 RX ORDER — SUCRALFATE 1 G/1
TAKE ONE TABLET BY MOUTH EVERY NIGHT AT BEDTIME TABLET ORAL
Qty: 90 | Refills: 0 | Status: ACTIVE | COMMUNITY

## 2023-03-16 RX ORDER — OMEPRAZOLE 40 MG/1
1 CAPSULE 30 MINUTES BEFORE MORNING MEAL CAPSULE, DELAYED RELEASE ORAL ONCE A DAY
Qty: 90 | Refills: 1 | Status: ACTIVE | COMMUNITY
Start: 2022-06-16

## 2023-03-16 RX ORDER — OMEPRAZOLE 40 MG/1
CAPSULE, DELAYED RELEASE PELLETS ORAL
Qty: 0 | Refills: 0 | Status: ACTIVE | COMMUNITY
Start: 1900-01-01

## 2023-03-16 RX ORDER — ACETAMINOPHEN ER 650 MG TABLET,EXTENDED RELEASE 650 MG
TABLET, EXTENDED RELEASE ORAL
Qty: 0 | Refills: 0 | Status: ACTIVE | COMMUNITY
Start: 1900-01-01

## 2023-03-16 RX ORDER — OMEPRAZOLE 40 MG/1
1 CAPSULE 30 MINUTES BEFORE MORNING MEAL CAPSULE, DELAYED RELEASE ORAL ONCE A DAY
Qty: 90 | Refills: 0 | Status: ACTIVE | COMMUNITY
Start: 2020-12-17

## 2023-03-16 NOTE — HPI-TODAY'S VISIT:
10/29/2020 Patient presents for a follow up office visit. Patient states she has been doing better, with occasional symptoms of GERD. She has intermittent symptoms of nausea in the morning. She did not undergo ENT evaluation of vocal cords. Patient states she has been avoiding chocolate. She is due for an EGD for surveillance of metaplasia.  12/17/2020 Patient presents for a follow up office visit. EGD done on 12/7/2020 showed gastritis, esophagitis, and Schaztki's ring s/p dilatation performed. Biopsy showed gastropathy no metaplasia and no H pylori, no Barretts noted.  Patient states she is doing well. She states after dilatation she was sore for about 3 days, but she no longer has any issues with dysphagia. She did see the ENT physician, no pathology noted. She continues to take omeprazole and carafate.  06/24/2021 Patient presents for a follow up office visit. Her acid reflux has improved with only occasional symptoms depending on her diet. Rice and spaghetti worsen her reflux and dysphagia. She currently takes omeprazole 40mg (in the morning) and carafate (twice a day). She had EGD with dilation done last year 12/2020. Overall she has been feeling well. Patient is fully vaccinated for Covid.  06/16/2022 Patient presents for symptoms of diarrhea and for a colon cancer screening. She states that she has been having three weeks of very bad diarrhea and she has been taking imodium and pepto bismol. Patient also states that she has a little bit of dark stool but no blood in stool. She states that she has been having a lot of gas and some bloating in the lower area of the stomach. Patient has been stressed due to  passing away and she takes melatonin to be able to sleep.  9/22/2022 Patient presents for a procedure follow up. Patient states that she is still having gas and loose bowels. She states that she has around 3 loose bowel movements in the morning. Patient states that she does not take pain medications. She states that she has issues with osteoporosis and that she has not gotten checked up for a couple of years now. She states that she finished questran and that it did not help her. She also takes omeprazole and cholesterol medicine. Patient denies taking NSAIDs.  12/15/2022 Patient presents for a follow up. Patient denies having loose bowels. She states that she is having an eye surgery and that she had to stop entocort for the procedure. She denies bloating, stomach pain, or acid reflux. She denies states that she does have arthritis and that she only takes tylenol. Patient states that she has been on dairy free diet. Patient states that Dr. Salvador Palacios did not order her bone density test.  3/16/2023 Patient presents for a 3 month follow up. Patient states that she has been doing very well lately and that she is still on budesonide three times a day. She states that she has a very little recurrence of loose bowels. Patient states that she takes pantoprazole every day. Overall patient feels much better. She denies stomach pain and bloating.

## 2023-03-16 NOTE — PHYSICAL EXAM NECK/THYROID:
normal appearance , without tenderness upon palpation , no deformities , trachea midline , Thyroid normal size , no masses , thyroid nontender 20

## 2023-09-14 ENCOUNTER — OFFICE VISIT (OUTPATIENT)
Dept: URBAN - METROPOLITAN AREA CLINIC 115 | Facility: CLINIC | Age: 83
End: 2023-09-14
Payer: MEDICARE

## 2023-09-14 ENCOUNTER — LAB OUTSIDE AN ENCOUNTER (OUTPATIENT)
Dept: URBAN - METROPOLITAN AREA CLINIC 115 | Facility: CLINIC | Age: 83
End: 2023-09-14

## 2023-09-14 ENCOUNTER — DASHBOARD ENCOUNTERS (OUTPATIENT)
Age: 83
End: 2023-09-14

## 2023-09-14 VITALS
BODY MASS INDEX: 21.26 KG/M2 | HEART RATE: 82 BPM | DIASTOLIC BLOOD PRESSURE: 76 MMHG | TEMPERATURE: 97.5 F | HEIGHT: 65 IN | WEIGHT: 127.6 LBS | SYSTOLIC BLOOD PRESSURE: 125 MMHG

## 2023-09-14 DIAGNOSIS — K21.9 GERD (GASTROESOPHAGEAL REFLUX DISEASE): ICD-10-CM

## 2023-09-14 DIAGNOSIS — K52.832 LYMPHOCYTIC COLITIS: ICD-10-CM

## 2023-09-14 DIAGNOSIS — K58.0 IRRITABLE BOWEL SYNDROME WITH DIARRHEA: ICD-10-CM

## 2023-09-14 PROBLEM — 235595009 GASTROESOPHAGEAL REFLUX DISEASE: Status: ACTIVE | Noted: 2020-10-29

## 2023-09-14 PROBLEM — 1187071008: Status: ACTIVE | Noted: 2022-09-22

## 2023-09-14 PROBLEM — 197125005: Status: ACTIVE | Noted: 2022-06-16

## 2023-09-14 PROCEDURE — 99214 OFFICE O/P EST MOD 30 MIN: CPT | Performed by: INTERNAL MEDICINE

## 2023-09-14 RX ORDER — TRAMADOL HYDROCHLORIDE 50 MG/1
TABLET ORAL
Qty: 0 | Refills: 0 | Status: ACTIVE | COMMUNITY
Start: 1900-01-01

## 2023-09-14 RX ORDER — OMEPRAZOLE 40 MG/1
CAPSULE, DELAYED RELEASE PELLETS ORAL
Qty: 0 | Refills: 0 | Status: ACTIVE | COMMUNITY
Start: 1900-01-01

## 2023-09-14 RX ORDER — ATORVASTATIN CALCIUM 10 MG/1
TAKE 1 TABLET (10 MG) BY ORAL ROUTE ONCE DAILY TABLET, FILM COATED ORAL 1
Qty: 0 | Refills: 0 | Status: ACTIVE | COMMUNITY
Start: 1900-01-01

## 2023-09-14 RX ORDER — CHOLESTYRAMINE 4 G/9G
1 PACKET MIXED WITH WATER OR NON-CARBONATED DRINK POWDER, FOR SUSPENSION ORAL TWICE A DAY
Qty: 60 | Refills: 1 | Status: ACTIVE | COMMUNITY
Start: 2022-06-16

## 2023-09-14 RX ORDER — ACETAMINOPHEN 325 MG/1
TAKE 1 TABLET (325 MG) BY ORAL ROUTE EVERY 4 HOURS AS NEEDED TABLET, FILM COATED ORAL
Qty: 0 | Refills: 0 | Status: ACTIVE | COMMUNITY
Start: 1900-01-01

## 2023-09-14 RX ORDER — OMEPRAZOLE 40 MG/1
1 CAPSULE 30 MINUTES BEFORE MORNING MEAL CAPSULE, DELAYED RELEASE ORAL ONCE A DAY
Qty: 90 | Refills: 1 | Status: ACTIVE | COMMUNITY
Start: 2020-07-17

## 2023-09-14 RX ORDER — OMEPRAZOLE 40 MG/1
1 CAPSULE 30 MINUTES BEFORE MORNING MEAL CAPSULE, DELAYED RELEASE ORAL ONCE A DAY
Qty: 90 | Refills: 0 | Status: ACTIVE | COMMUNITY
Start: 2020-12-17

## 2023-09-14 RX ORDER — CHOLESTYRAMINE 4 G/9G
1 PACKET MIXED WITH WATER OR NON-CARBONATED DRINK POWDER, FOR SUSPENSION ORAL ONCE A DAY
Qty: 90 | Refills: 1 | OUTPATIENT
Start: 2023-09-14

## 2023-09-14 RX ORDER — SUCRALFATE 1 G/1
TAKE ONE TABLET BY MOUTH EVERY NIGHT AT BEDTIME TABLET ORAL
Qty: 90 | Refills: 0 | Status: ACTIVE | COMMUNITY

## 2023-09-14 RX ORDER — OMEPRAZOLE 40 MG/1
1 CAPSULE 30 MINUTES BEFORE MORNING MEAL CAPSULE, DELAYED RELEASE ORAL ONCE A DAY
Qty: 90 | Refills: 1 | Status: ACTIVE | COMMUNITY
Start: 2022-06-16

## 2023-09-14 RX ORDER — ACETAMINOPHEN ER 650 MG TABLET,EXTENDED RELEASE 650 MG
TABLET, EXTENDED RELEASE ORAL
Qty: 0 | Refills: 0 | Status: ACTIVE | COMMUNITY
Start: 1900-01-01

## 2023-09-14 RX ORDER — POLYETHYLENE GLYCOL-3350 AND ELECTROLYTES WITH FLAVOR PACK 240; 5.84; 2.98; 6.72; 22.72 G/278.26G; G/278.26G; G/278.26G; G/278.26G; G/278.26G
AS DIRECTED POWDER, FOR SOLUTION ORAL
Qty: 1 | Refills: 0 | OUTPATIENT
Start: 2023-09-14 | End: 2023-09-15

## 2023-09-14 NOTE — HPI-TODAY'S VISIT:
10/29/2020 Patient presents for a follow up office visit. Patient states she has been doing better, with occasional symptoms of GERD. She has intermittent symptoms of nausea in the morning. She did not undergo ENT evaluation of vocal cords. Patient states she has been avoiding chocolate. She is due for an EGD for surveillance of metaplasia.  12/17/2020 Patient presents for a follow up office visit. EGD done on 12/7/2020 showed gastritis, esophagitis, and Schaztki's ring s/p dilatation performed. Biopsy showed gastropathy no metaplasia and no H pylori, no Barretts noted.  Patient states she is doing well. She states after dilatation she was sore for about 3 days, but she no longer has any issues with dysphagia. She did see the ENT physician, no pathology noted. She continues to take omeprazole and carafate.  06/24/2021 Patient presents for a follow up office visit. Her acid reflux has improved with only occasional symptoms depending on her diet. Rice and spaghetti worsen her reflux and dysphagia. She currently takes omeprazole 40mg (in the morning) and carafate (twice a day). She had EGD with dilation done last year 12/2020. Overall she has been feeling well. Patient is fully vaccinated for Covid.  06/16/2022 Patient presents for symptoms of diarrhea and for a colon cancer screening. She states that she has been having three weeks of very bad diarrhea and she has been taking imodium and pepto bismol. Patient also states that she has a little bit of dark stool but no blood in stool. She states that she has been having a lot of gas and some bloating in the lower area of the stomach. Patient has been stressed due to  passing away and she takes melatonin to be able to sleep.  9/22/2022 Patient presents for a procedure follow up. Patient states that she is still having gas and loose bowels. She states that she has around 3 loose bowel movements in the morning. Patient states that she does not take pain medications. She states that she has issues with osteoporosis and that she has not gotten checked up for a couple of years now. She states that she finished questran and that it did not help her. She also takes omeprazole and cholesterol medicine. Patient denies taking NSAIDs.  12/15/2022 Patient presents for a follow up. Patient denies having loose bowels. She states that she is having an eye surgery and that she had to stop entocort for the procedure. She denies bloating, stomach pain, or acid reflux. She denies states that she does have arthritis and that she only takes tylenol. Patient states that she has been on dairy free diet. Patient states that Dr. Salvador Palacios did not order her bone density test.  3/16/2023 Patient presents for a 3 month follow up. Patient states that she has been doing very well lately and that she is still on budesonide three times a day. She states that she has a very little recurrence of loose bowels. Patient states that she takes pantoprazole every day. Overall patient feels much better. She denies stomach pain and bloating.  9/14/2023 Patient presents for a follow up. Patient states that her diarrhea is not under control. She states that this morning, after her meal, she had to run to the restroom to have a bowel movement. Patient states that she has loose bowels almost every day in the mornings after she eats. She denies being on budesonide or questran powder. Patient denies being on any new medications. She denies knowing if she has food allergies.

## 2023-10-27 ENCOUNTER — CLAIMS CREATED FROM THE CLAIM WINDOW (OUTPATIENT)
Dept: URBAN - METROPOLITAN AREA CLINIC 4 | Facility: CLINIC | Age: 83
End: 2023-10-27
Payer: MEDICARE

## 2023-10-27 ENCOUNTER — OFFICE VISIT (OUTPATIENT)
Dept: URBAN - METROPOLITAN AREA SURGERY CENTER 13 | Facility: SURGERY CENTER | Age: 83
End: 2023-10-27
Payer: MEDICARE

## 2023-10-27 DIAGNOSIS — K64.0 GRADE I HEMORRHOIDS: ICD-10-CM

## 2023-10-27 DIAGNOSIS — K57.30 COLON, DIVERTICULOSIS: ICD-10-CM

## 2023-10-27 DIAGNOSIS — R19.7 DIARRHEA, UNSPECIFIED TYPE: ICD-10-CM

## 2023-10-27 DIAGNOSIS — K63.89 OTHER SPECIFIED DISEASES OF INTESTINE: ICD-10-CM

## 2023-10-27 DIAGNOSIS — K59.1 DIARRHEA: ICD-10-CM

## 2023-10-27 PROCEDURE — 88305 TISSUE EXAM BY PATHOLOGIST: CPT | Performed by: PATHOLOGY

## 2023-10-27 PROCEDURE — 45380 COLONOSCOPY AND BIOPSY: CPT | Performed by: CLINIC/CENTER

## 2023-10-27 PROCEDURE — 00811 ANES LWR INTST NDSC NOS: CPT | Performed by: ANESTHESIOLOGY

## 2023-10-27 PROCEDURE — 00811 ANES LWR INTST NDSC NOS: CPT | Performed by: NURSE ANESTHETIST, CERTIFIED REGISTERED

## 2023-10-27 PROCEDURE — G8907 PT DOC NO EVENTS ON DISCHARG: HCPCS | Performed by: CLINIC/CENTER

## 2023-10-27 PROCEDURE — 45380 COLONOSCOPY AND BIOPSY: CPT | Performed by: INTERNAL MEDICINE

## 2023-11-02 ENCOUNTER — OFFICE VISIT (OUTPATIENT)
Dept: URBAN - METROPOLITAN AREA CLINIC 115 | Facility: CLINIC | Age: 83
End: 2023-11-02

## 2023-12-14 ENCOUNTER — OFFICE VISIT (OUTPATIENT)
Dept: URBAN - METROPOLITAN AREA CLINIC 115 | Facility: CLINIC | Age: 83
End: 2023-12-14

## 2024-01-19 ENCOUNTER — TELEPHONE ENCOUNTER (OUTPATIENT)
Dept: URBAN - METROPOLITAN AREA CLINIC 115 | Facility: CLINIC | Age: 84
End: 2024-01-19

## 2024-08-01 ENCOUNTER — OFFICE VISIT (OUTPATIENT)
Dept: URBAN - METROPOLITAN AREA CLINIC 115 | Facility: CLINIC | Age: 84
End: 2024-08-01

## 2024-10-03 ENCOUNTER — OFFICE VISIT (OUTPATIENT)
Dept: URBAN - METROPOLITAN AREA CLINIC 115 | Facility: CLINIC | Age: 84
End: 2024-10-03
Payer: MEDICARE

## 2024-10-03 VITALS
TEMPERATURE: 97.7 F | WEIGHT: 125.6 LBS | BODY MASS INDEX: 20.93 KG/M2 | DIASTOLIC BLOOD PRESSURE: 81 MMHG | HEART RATE: 76 BPM | HEIGHT: 65 IN | SYSTOLIC BLOOD PRESSURE: 143 MMHG

## 2024-10-03 DIAGNOSIS — K21.9 GERD (GASTROESOPHAGEAL REFLUX DISEASE): ICD-10-CM

## 2024-10-03 DIAGNOSIS — R10.84 GENERALIZED ABDOMINAL PAIN: ICD-10-CM

## 2024-10-03 DIAGNOSIS — R13.10 DYSPHAGIA: ICD-10-CM

## 2024-10-03 DIAGNOSIS — K52.832 LYMPHOCYTIC COLITIS: ICD-10-CM

## 2024-10-03 DIAGNOSIS — K58.0 IRRITABLE BOWEL SYNDROME WITH DIARRHEA: ICD-10-CM

## 2024-10-03 DIAGNOSIS — Z86.0100 HISTORY OF COLON POLYPS: ICD-10-CM

## 2024-10-03 PROCEDURE — 99214 OFFICE O/P EST MOD 30 MIN: CPT | Performed by: INTERNAL MEDICINE

## 2024-10-03 RX ORDER — ACETAMINOPHEN ER 650 MG TABLET,EXTENDED RELEASE 650 MG
TABLET, EXTENDED RELEASE ORAL
Qty: 0 | Refills: 0 | Status: ACTIVE | COMMUNITY
Start: 1900-01-01

## 2024-10-03 RX ORDER — CHOLESTYRAMINE 4 G/9G
1 PACKET MIXED WITH WATER OR NON-CARBONATED DRINK POWDER, FOR SUSPENSION ORAL TWICE A DAY
Qty: 60 | Refills: 1 | Status: ACTIVE | COMMUNITY
Start: 2022-06-16

## 2024-10-03 RX ORDER — OMEPRAZOLE 40 MG/1
1 CAPSULE 30 MINUTES BEFORE MORNING MEAL CAPSULE, DELAYED RELEASE ORAL ONCE A DAY
Qty: 90 | Refills: 0 | Status: ACTIVE | COMMUNITY
Start: 2020-12-17

## 2024-10-03 RX ORDER — CHOLESTYRAMINE 4 G/9G
1 PACKET MIXED WITH WATER OR NON-CARBONATED DRINK POWDER, FOR SUSPENSION ORAL ONCE A DAY
Qty: 90 | Refills: 1 | Status: ACTIVE | COMMUNITY
Start: 2023-09-14

## 2024-10-03 RX ORDER — OMEPRAZOLE 40 MG/1
CAPSULE, DELAYED RELEASE PELLETS ORAL
Qty: 0 | Refills: 0 | Status: ACTIVE | COMMUNITY
Start: 1900-01-01

## 2024-10-03 RX ORDER — OMEPRAZOLE 40 MG/1
1 CAPSULE 30 MINUTES BEFORE MORNING MEAL CAPSULE, DELAYED RELEASE ORAL ONCE A DAY
Qty: 90 | Refills: 1 | Status: ACTIVE | COMMUNITY
Start: 2020-07-17

## 2024-10-03 RX ORDER — DICYCLOMINE HYDROCHLORIDE 10 MG/1
1 TABLET CAPSULE ORAL THREE TIMES A DAY
Qty: 270 TABLET | Refills: 1 | OUTPATIENT
Start: 2024-10-03 | End: 2025-04-01

## 2024-10-03 RX ORDER — SUCRALFATE 1 G/1
TAKE ONE TABLET BY MOUTH EVERY NIGHT AT BEDTIME TABLET ORAL
Qty: 90 | Refills: 0 | Status: ACTIVE | COMMUNITY

## 2024-10-03 RX ORDER — TRAMADOL HYDROCHLORIDE 50 MG/1
TABLET, FILM COATED ORAL
Qty: 0 | Refills: 0 | Status: ACTIVE | COMMUNITY
Start: 1900-01-01

## 2024-10-03 RX ORDER — ATORVASTATIN CALCIUM 10 MG/1
TAKE 1 TABLET (10 MG) BY ORAL ROUTE ONCE DAILY TABLET, FILM COATED ORAL 1
Qty: 0 | Refills: 0 | Status: ACTIVE | COMMUNITY
Start: 1900-01-01

## 2024-10-03 RX ORDER — OMEPRAZOLE 40 MG/1
1 CAPSULE 30 MINUTES BEFORE MORNING MEAL CAPSULE, DELAYED RELEASE ORAL ONCE A DAY
Qty: 90 | Refills: 1 | Status: ACTIVE | COMMUNITY
Start: 2022-06-16

## 2024-10-03 RX ORDER — ACETAMINOPHEN 325 MG/1
TAKE 1 TABLET (325 MG) BY ORAL ROUTE EVERY 4 HOURS AS NEEDED TABLET, FILM COATED ORAL
Qty: 0 | Refills: 0 | Status: ACTIVE | COMMUNITY
Start: 1900-01-01

## 2024-10-03 NOTE — HPI-TODAY'S VISIT:
10/29/2020 Patient presents for a follow up office visit. Patient states she has been doing better, with occasional symptoms of GERD. She has intermittent symptoms of nausea in the morning. She did not undergo ENT evaluation of vocal cords. Patient states she has been avoiding chocolate. She is due for an EGD for surveillance of metaplasia.  12/17/2020 Patient presents for a follow up office visit. EGD done on 12/7/2020 showed gastritis, esophagitis, and Schaztki's ring s/p dilatation performed. Biopsy showed gastropathy no metaplasia and no H pylori, no Barretts noted.  Patient states she is doing well. She states after dilatation she was sore for about 3 days, but she no longer has any issues with dysphagia. She did see the ENT physician, no pathology noted. She continues to take omeprazole and carafate.  06/24/2021 Patient presents for a follow up office visit. Her acid reflux has improved with only occasional symptoms depending on her diet. Rice and spaghetti worsen her reflux and dysphagia. She currently takes omeprazole 40mg (in the morning) and carafate (twice a day). She had EGD with dilation done last year 12/2020. Overall she has been feeling well. Patient is fully vaccinated for Covid.  06/16/2022 Patient presents for symptoms of diarrhea and for a colon cancer screening. She states that she has been having three weeks of very bad diarrhea and she has been taking imodium and pepto bismol. Patient also states that she has a little bit of dark stool but no blood in stool. She states that she has been having a lot of gas and some bloating in the lower area of the stomach. Patient has been stressed due to  passing away and she takes melatonin to be able to sleep.  9/22/2022 Patient presents for a procedure follow up. Patient states that she is still having gas and loose bowels. She states that she has around 3 loose bowel movements in the morning. Patient states that she does not take pain medications. She states that she has issues with osteoporosis and that she has not gotten checked up for a couple of years now. She states that she finished questran and that it did not help her. She also takes omeprazole and cholesterol medicine. Patient denies taking NSAIDs.  12/15/2022 Patient presents for a follow up. Patient denies having loose bowels. She states that she is having an eye surgery and that she had to stop entocort for the procedure. She denies bloating, stomach pain, or acid reflux. She denies states that she does have arthritis and that she only takes tylenol. Patient states that she has been on dairy free diet. Patient states that Dr. Salvador Palacios did not order her bone density test.  3/16/2023 Patient presents for a 3 month follow up. Patient states that she has been doing very well lately and that she is still on budesonide three times a day. She states that she has a very little recurrence of loose bowels. Patient states that she takes pantoprazole every day. Overall patient feels much better. She denies stomach pain and bloating.  9/14/2023 Patient presents for a follow up. Patient states that her diarrhea is not under control. She states that this morning, after her meal, she had to run to the restroom to have a bowel movement. Patient states that she has loose bowels almost every day in the mornings after she eats. She denies being on budesonide or questran powder. Patient denies being on any new medications. She denies knowing if she has food allergies.  10/3/2024 Patient presents for a follow up. Patient says that in April and June she was having terrible pain. She says that she saw Dr. Merida and she helped to treat her abdominal pain. she says that she has been having gas and has been taking pantoprazole and questran powder. Patient also says that she has been taking Align. She denies a change in eating habits and had a dairy free diet. She denies use of dicyclomine.

## 2024-10-07 ENCOUNTER — TELEPHONE ENCOUNTER (OUTPATIENT)
Dept: URBAN - METROPOLITAN AREA CLINIC 115 | Facility: CLINIC | Age: 84
End: 2024-10-07

## 2024-10-07 RX ORDER — CHOLESTYRAMINE 4 G/9G
1 PACKET MIXED WITH WATER OR NON-CARBONATED DRINK POWDER, FOR SUSPENSION ORAL TWICE A DAY
Qty: 60 | Refills: 1
Start: 2022-06-16

## 2024-12-12 ENCOUNTER — OFFICE VISIT (OUTPATIENT)
Dept: URBAN - METROPOLITAN AREA CLINIC 115 | Facility: CLINIC | Age: 84
End: 2024-12-12
Payer: MEDICARE

## 2024-12-12 VITALS
HEIGHT: 65 IN | HEART RATE: 70 BPM | WEIGHT: 123.8 LBS | DIASTOLIC BLOOD PRESSURE: 61 MMHG | SYSTOLIC BLOOD PRESSURE: 123 MMHG | BODY MASS INDEX: 20.62 KG/M2 | TEMPERATURE: 98 F

## 2024-12-12 DIAGNOSIS — R13.10 DYSPHAGIA: ICD-10-CM

## 2024-12-12 DIAGNOSIS — Z86.0100 PERSONAL HISTORY OF COLONIC POLYPS: ICD-10-CM

## 2024-12-12 DIAGNOSIS — R14.0 BLOATING: ICD-10-CM

## 2024-12-12 DIAGNOSIS — K52.832 LYMPHOCYTIC COLITIS: ICD-10-CM

## 2024-12-12 DIAGNOSIS — K21.9 GERD (GASTROESOPHAGEAL REFLUX DISEASE): ICD-10-CM

## 2024-12-12 DIAGNOSIS — K58.0 IRRITABLE BOWEL SYNDROME WITH DIARRHEA: ICD-10-CM

## 2024-12-12 PROCEDURE — 99212 OFFICE O/P EST SF 10 MIN: CPT | Performed by: INTERNAL MEDICINE

## 2024-12-12 RX ORDER — DICYCLOMINE HYDROCHLORIDE 10 MG/1
1 TABLET CAPSULE ORAL THREE TIMES A DAY
Qty: 270 TABLET | Refills: 1 | Status: ACTIVE | COMMUNITY
Start: 2024-10-03 | End: 2025-04-01

## 2024-12-12 RX ORDER — OMEPRAZOLE 40 MG/1
CAPSULE, DELAYED RELEASE PELLETS ORAL
Qty: 0 | Refills: 0 | Status: ACTIVE | COMMUNITY
Start: 1900-01-01

## 2024-12-12 RX ORDER — TRAMADOL HYDROCHLORIDE 50 MG/1
TABLET, FILM COATED ORAL
Qty: 0 | Refills: 0 | Status: ACTIVE | COMMUNITY
Start: 1900-01-01

## 2024-12-12 RX ORDER — ACETAMINOPHEN ER 650 MG TABLET,EXTENDED RELEASE 650 MG
TABLET, EXTENDED RELEASE ORAL
Qty: 0 | Refills: 0 | Status: ACTIVE | COMMUNITY
Start: 1900-01-01

## 2024-12-12 RX ORDER — CHOLESTYRAMINE 4 G/9G
1 PACKET MIXED WITH WATER OR NON-CARBONATED DRINK POWDER, FOR SUSPENSION ORAL ONCE A DAY
Qty: 90 | Refills: 1 | Status: ACTIVE | COMMUNITY
Start: 2023-09-14

## 2024-12-12 RX ORDER — SUCRALFATE 1 G/1
TAKE ONE TABLET BY MOUTH EVERY NIGHT AT BEDTIME TABLET ORAL
Qty: 90 | Refills: 0 | Status: ACTIVE | COMMUNITY

## 2024-12-12 RX ORDER — OMEPRAZOLE 40 MG/1
1 CAPSULE 30 MINUTES BEFORE MORNING MEAL CAPSULE, DELAYED RELEASE ORAL ONCE A DAY
Qty: 90 | Refills: 0 | Status: ACTIVE | COMMUNITY
Start: 2020-12-17

## 2024-12-12 RX ORDER — CHOLESTYRAMINE 4 G/9G
1 PACKET MIXED WITH WATER OR NON-CARBONATED DRINK POWDER, FOR SUSPENSION ORAL TWICE A DAY
Qty: 60 | Refills: 1 | Status: ACTIVE | COMMUNITY
Start: 2022-06-16

## 2024-12-12 RX ORDER — OMEPRAZOLE 40 MG/1
1 CAPSULE 30 MINUTES BEFORE MORNING MEAL CAPSULE, DELAYED RELEASE ORAL ONCE A DAY
Qty: 90 | Refills: 1 | Status: ACTIVE | COMMUNITY
Start: 2022-06-16

## 2024-12-12 RX ORDER — ACETAMINOPHEN 325 MG/1
TAKE 1 TABLET (325 MG) BY ORAL ROUTE EVERY 4 HOURS AS NEEDED TABLET, FILM COATED ORAL
Qty: 0 | Refills: 0 | Status: ACTIVE | COMMUNITY
Start: 1900-01-01

## 2024-12-12 RX ORDER — ATORVASTATIN CALCIUM 10 MG/1
TAKE 1 TABLET (10 MG) BY ORAL ROUTE ONCE DAILY TABLET, FILM COATED ORAL 1
Qty: 0 | Refills: 0 | Status: ACTIVE | COMMUNITY
Start: 1900-01-01

## 2024-12-12 RX ORDER — OMEPRAZOLE 40 MG/1
1 CAPSULE 30 MINUTES BEFORE MORNING MEAL CAPSULE, DELAYED RELEASE ORAL ONCE A DAY
Qty: 90 | Refills: 1 | Status: ACTIVE | COMMUNITY
Start: 2020-07-17

## 2024-12-12 RX ORDER — MOXIFLOXACIN HYDROCHLORIDE 400 MG/1
1 TABLET TABLET, FILM COATED ORAL ONCE A DAY
Qty: 7 TABLET | Refills: 0 | OUTPATIENT
Start: 2024-12-12 | End: 2024-12-19

## 2024-12-12 NOTE — HPI-TODAY'S VISIT:
10/29/2020 Patient presents for a follow up office visit. Patient states she has been doing better, with occasional symptoms of GERD. She has intermittent symptoms of nausea in the morning. She did not undergo ENT evaluation of vocal cords. Patient states she has been avoiding chocolate. She is due for an EGD for surveillance of metaplasia.  12/17/2020 Patient presents for a follow up office visit. EGD done on 12/7/2020 showed gastritis, esophagitis, and Schaztki's ring s/p dilatation performed. Biopsy showed gastropathy no metaplasia and no H pylori, no Barretts noted.  Patient states she is doing well. She states after dilatation she was sore for about 3 days, but she no longer has any issues with dysphagia. She did see the ENT physician, no pathology noted. She continues to take omeprazole and carafate.  06/24/2021 Patient presents for a follow up office visit. Her acid reflux has improved with only occasional symptoms depending on her diet. Rice and spaghetti worsen her reflux and dysphagia. She currently takes omeprazole 40mg (in the morning) and carafate (twice a day). She had EGD with dilation done last year 12/2020. Overall she has been feeling well. Patient is fully vaccinated for Covid.  06/16/2022 Patient presents for symptoms of diarrhea and for a colon cancer screening. She states that she has been having three weeks of very bad diarrhea and she has been taking imodium and pepto bismol. Patient also states that she has a little bit of dark stool but no blood in stool. She states that she has been having a lot of gas and some bloating in the lower area of the stomach. Patient has been stressed due to  passing away and she takes melatonin to be able to sleep.  9/22/2022 Patient presents for a procedure follow up. Patient states that she is still having gas and loose bowels. She states that she has around 3 loose bowel movements in the morning. Patient states that she does not take pain medications. She states that she has issues with osteoporosis and that she has not gotten checked up for a couple of years now. She states that she finished questran and that it did not help her. She also takes omeprazole and cholesterol medicine. Patient denies taking NSAIDs.  12/15/2022 Patient presents for a follow up. Patient denies having loose bowels. She states that she is having an eye surgery and that she had to stop entocort for the procedure. She denies bloating, stomach pain, or acid reflux. She denies states that she does have arthritis and that she only takes tylenol. Patient states that she has been on dairy free diet. Patient states that Dr. Salvador Palacios did not order her bone density test.  3/16/2023 Patient presents for a 3 month follow up. Patient states that she has been doing very well lately and that she is still on budesonide three times a day. She states that she has a very little recurrence of loose bowels. Patient states that she takes pantoprazole every day. Overall patient feels much better. She denies stomach pain and bloating.  9/14/2023 Patient presents for a follow up. Patient states that her diarrhea is not under control. She states that this morning, after her meal, she had to run to the restroom to have a bowel movement. Patient states that she has loose bowels almost every day in the mornings after she eats. She denies being on budesonide or questran powder. Patient denies being on any new medications. She denies knowing if she has food allergies.  10/3/2024 Patient presents for a follow up. Patient says that in April and June she was having terrible pain. She says that she saw Dr. Merida and she helped to treat her abdominal pain. she says that she has been having gas and has been taking pantoprazole and questran powder. Patient also says that she has been taking Align. She denies a change in eating habits and had a dairy free diet. She denies use of dicyclomine.  12/12/2024 Patient presents for follow up. Patient says she is still having bloating while takin gthe dicyclomine. Patient says after eating "ham" she has the "worst gas" afterwards. She says that she has tried IbGard in the past. She denies use of NSAIDs and only uses tylenol. She takes questran daily and says her stool is formed. Patient has been taking Align for gas and fels like everything she eats has been causing her gas. Patient has been avoiding dairy and milk products

## 2024-12-16 ENCOUNTER — TELEPHONE ENCOUNTER (OUTPATIENT)
Dept: URBAN - METROPOLITAN AREA CLINIC 115 | Facility: CLINIC | Age: 84
End: 2024-12-16

## 2024-12-16 RX ORDER — CHOLESTYRAMINE 4 G/9G
1 PACKET MIXED WITH WATER OR NON-CARBONATED DRINK POWDER, FOR SUSPENSION ORAL TWICE A DAY
Qty: 60 | Refills: 1
Start: 2022-06-16

## 2025-03-13 ENCOUNTER — OFFICE VISIT (OUTPATIENT)
Dept: URBAN - METROPOLITAN AREA CLINIC 115 | Facility: CLINIC | Age: 85
End: 2025-03-13

## 2025-04-03 ENCOUNTER — OFFICE VISIT (OUTPATIENT)
Dept: URBAN - METROPOLITAN AREA CLINIC 115 | Facility: CLINIC | Age: 85
End: 2025-04-03
Payer: MEDICARE

## 2025-04-03 DIAGNOSIS — R14.0 BLOATING: ICD-10-CM

## 2025-04-03 DIAGNOSIS — Z86.0100 PERSONAL HISTORY OF COLONIC POLYPS: ICD-10-CM

## 2025-04-03 DIAGNOSIS — K21.9 GERD (GASTROESOPHAGEAL REFLUX DISEASE): ICD-10-CM

## 2025-04-03 DIAGNOSIS — R13.10 DYSPHAGIA: ICD-10-CM

## 2025-04-03 DIAGNOSIS — K52.832 LYMPHOCYTIC COLITIS: ICD-10-CM

## 2025-04-03 DIAGNOSIS — K58.0 IRRITABLE BOWEL SYNDROME WITH DIARRHEA: ICD-10-CM

## 2025-04-03 DIAGNOSIS — R14.3 EXCESSIVE GAS: ICD-10-CM

## 2025-04-03 PROCEDURE — 99214 OFFICE O/P EST MOD 30 MIN: CPT | Performed by: INTERNAL MEDICINE

## 2025-04-03 RX ORDER — FEXOFENADINE HCL 180 MG/1
TAKE 2 TABLETS BY MOUTH EVERY MORNING WITH BREAKFAST TABLET ORAL
Qty: 90 EACH | Refills: 0 | Status: ACTIVE | COMMUNITY

## 2025-04-03 RX ORDER — CHOLESTYRAMINE 4 G/9G
1 PACKET MIXED WITH WATER OR NON-CARBONATED DRINK POWDER, FOR SUSPENSION ORAL TWICE A DAY
Qty: 60 | Refills: 1 | Status: ACTIVE | COMMUNITY
Start: 2022-06-16

## 2025-04-03 RX ORDER — OMEPRAZOLE 40 MG/1
1 CAPSULE 30 MINUTES BEFORE MORNING MEAL CAPSULE, DELAYED RELEASE ORAL ONCE A DAY
Qty: 90 | Refills: 1 | Status: ACTIVE | COMMUNITY
Start: 2022-06-16

## 2025-04-03 RX ORDER — ATORVASTATIN CALCIUM 10 MG/1
TAKE 1 TABLET (10 MG) BY ORAL ROUTE ONCE DAILY TABLET, FILM COATED ORAL 1
Qty: 0 | Refills: 0 | Status: ACTIVE | COMMUNITY
Start: 1900-01-01

## 2025-04-03 RX ORDER — OMEPRAZOLE 40 MG/1
1 CAPSULE 30 MINUTES BEFORE MORNING MEAL CAPSULE, DELAYED RELEASE ORAL ONCE A DAY
Qty: 90 | Refills: 1 | Status: ACTIVE | COMMUNITY
Start: 2020-07-17

## 2025-04-03 RX ORDER — ACETAMINOPHEN 325 MG/1
TAKE 1 TABLET (325 MG) BY ORAL ROUTE EVERY 4 HOURS AS NEEDED TABLET, FILM COATED ORAL
Qty: 0 | Refills: 0 | Status: ACTIVE | COMMUNITY
Start: 1900-01-01

## 2025-04-03 RX ORDER — OMEPRAZOLE 40 MG/1
CAPSULE, DELAYED RELEASE PELLETS ORAL
Qty: 0 | Refills: 0 | Status: ACTIVE | COMMUNITY
Start: 1900-01-01

## 2025-04-03 RX ORDER — OMEPRAZOLE 40 MG/1
1 CAPSULE 30 MINUTES BEFORE MORNING MEAL CAPSULE, DELAYED RELEASE ORAL ONCE A DAY
Qty: 90 | Refills: 0 | Status: ACTIVE | COMMUNITY
Start: 2020-12-17

## 2025-04-03 RX ORDER — ACETAMINOPHEN ER 650 MG TABLET,EXTENDED RELEASE 650 MG
TABLET, EXTENDED RELEASE ORAL
Qty: 0 | Refills: 0 | Status: ACTIVE | COMMUNITY
Start: 1900-01-01

## 2025-04-03 RX ORDER — TRAMADOL HYDROCHLORIDE 50 MG/1
TABLET, FILM COATED ORAL
Qty: 0 | Refills: 0 | Status: ACTIVE | COMMUNITY
Start: 1900-01-01

## 2025-04-03 RX ORDER — CHOLESTYRAMINE 4 G/9G
1 PACKET MIXED WITH WATER OR NON-CARBONATED DRINK POWDER, FOR SUSPENSION ORAL ONCE A DAY
Qty: 90 | Refills: 1 | Status: ACTIVE | COMMUNITY
Start: 2023-09-14

## 2025-04-03 RX ORDER — SUCRALFATE 1 G/1
TAKE ONE TABLET BY MOUTH EVERY NIGHT AT BEDTIME TABLET ORAL
Qty: 90 | Refills: 0 | Status: ACTIVE | COMMUNITY

## 2025-04-03 NOTE — HPI-TODAY'S VISIT:
10/29/2020 Patient presents for a follow up office visit. Patient states she has been doing better, with occasional symptoms of GERD. She has intermittent symptoms of nausea in the morning. She did not undergo ENT evaluation of vocal cords. Patient states she has been avoiding chocolate. She is due for an EGD for surveillance of metaplasia.  12/17/2020 Patient presents for a follow up office visit. EGD done on 12/7/2020 showed gastritis, esophagitis, and Schaztki's ring s/p dilatation performed. Biopsy showed gastropathy no metaplasia and no H pylori, no Barretts noted.  Patient states she is doing well. She states after dilatation she was sore for about 3 days, but she no longer has any issues with dysphagia. She did see the ENT physician, no pathology noted. She continues to take omeprazole and carafate.  06/24/2021 Patient presents for a follow up office visit. Her acid reflux has improved with only occasional symptoms depending on her diet. Rice and spaghetti worsen her reflux and dysphagia. She currently takes omeprazole 40mg (in the morning) and carafate (twice a day). She had EGD with dilation done last year 12/2020. Overall she has been feeling well. Patient is fully vaccinated for Covid.  06/16/2022 Patient presents for symptoms of diarrhea and for a colon cancer screening. She states that she has been having three weeks of very bad diarrhea and she has been taking imodium and pepto bismol. Patient also states that she has a little bit of dark stool but no blood in stool. She states that she has been having a lot of gas and some bloating in the lower area of the stomach. Patient has been stressed due to  passing away and she takes melatonin to be able to sleep.  9/22/2022 Patient presents for a procedure follow up. Patient states that she is still having gas and loose bowels. She states that she has around 3 loose bowel movements in the morning. Patient states that she does not take pain medications. She states that she has issues with osteoporosis and that she has not gotten checked up for a couple of years now. She states that she finished questran and that it did not help her. She also takes omeprazole and cholesterol medicine. Patient denies taking NSAIDs.  12/15/2022 Patient presents for a follow up. Patient denies having loose bowels. She states that she is having an eye surgery and that she had to stop entocort for the procedure. She denies bloating, stomach pain, or acid reflux. She denies states that she does have arthritis and that she only takes tylenol. Patient states that she has been on dairy free diet. Patient states that Dr. Salvador Palacios did not order her bone density test.  3/16/2023 Patient presents for a 3 month follow up. Patient states that she has been doing very well lately and that she is still on budesonide three times a day. She states that she has a very little recurrence of loose bowels. Patient states that she takes pantoprazole every day. Overall patient feels much better. She denies stomach pain and bloating.  9/14/2023 Patient presents for a follow up. Patient states that her diarrhea is not under control. She states that this morning, after her meal, she had to run to the restroom to have a bowel movement. Patient states that she has loose bowels almost every day in the mornings after she eats. She denies being on budesonide or questran powder. Patient denies being on any new medications. She denies knowing if she has food allergies.  10/3/2024 Patient presents for a follow up. Patient says that in April and June she was having terrible pain. She says that she saw Dr. Merida and she helped to treat her abdominal pain. she says that she has been having gas and has been taking pantoprazole and questran powder. Patient also says that she has been taking Align. She denies a change in eating habits and had a dairy free diet. She denies use of dicyclomine.  12/12/2024 Patient presents for follow up. Patient says she is still having bloating while takin gthe dicyclomine. Patient says after eating "ham" she has the "worst gas" afterwards. She says that she has tried IbGard in the past. She denies use of NSAIDs and only uses tylenol. She takes questran daily and says her stool is formed. Patient has been taking Align for gas and fels like everything she eats has been causing her gas. Patient has been avoiding dairy and milk products  4/3/2025 .Patient presents for follow up. Patient says she has been taking Questran and OTC gas medications. She says she has still been having gas after eating. She says her symptoms resolve when she eats less, so she has been losing weight as she avoids eating to avoid gas symtpoms. She has chnaged her diet to plant based, and says she has not been eating foods that cause gas. She denies diarrhea, and constipation. Patient says her stool has been "soft" and "skinny", as she feels as though she is not fully cleaning her colon after bowel movements.

## 2025-04-07 ENCOUNTER — TELEPHONE ENCOUNTER (OUTPATIENT)
Dept: URBAN - METROPOLITAN AREA CLINIC 115 | Facility: CLINIC | Age: 85
End: 2025-04-07

## 2025-04-07 RX ORDER — RIFAXIMIN 550 MG/1
1 TABLET TABLET ORAL THREE TIMES A DAY
Qty: 42 TABLET | Refills: 0 | OUTPATIENT
Start: 2025-04-07 | End: 2025-04-21

## 2025-04-09 ENCOUNTER — TELEPHONE ENCOUNTER (OUTPATIENT)
Dept: URBAN - METROPOLITAN AREA CLINIC 82 | Facility: CLINIC | Age: 85
End: 2025-04-09

## 2025-04-10 ENCOUNTER — TELEPHONE ENCOUNTER (OUTPATIENT)
Dept: URBAN - METROPOLITAN AREA CLINIC 115 | Facility: CLINIC | Age: 85
End: 2025-04-10

## 2025-04-10 ENCOUNTER — TELEPHONE ENCOUNTER (OUTPATIENT)
Dept: URBAN - METROPOLITAN AREA CLINIC 92 | Facility: CLINIC | Age: 85
End: 2025-04-10

## 2025-05-08 ENCOUNTER — TELEPHONE ENCOUNTER (OUTPATIENT)
Dept: URBAN - METROPOLITAN AREA CLINIC 85 | Facility: CLINIC | Age: 85
End: 2025-05-08

## 2025-05-23 ENCOUNTER — TELEPHONE ENCOUNTER (OUTPATIENT)
Dept: URBAN - METROPOLITAN AREA CLINIC 82 | Facility: CLINIC | Age: 85
End: 2025-05-23

## 2025-05-23 RX ORDER — RIFAXIMIN 550 MG/1
1 TABLET TABLET ORAL THREE TIMES A DAY
Qty: 42 TABLET | Refills: 0 | OUTPATIENT
Start: 2025-05-23 | End: 2025-06-06

## 2025-06-06 ENCOUNTER — WEB ENCOUNTER (OUTPATIENT)
Dept: URBAN - METROPOLITAN AREA CLINIC 115 | Facility: CLINIC | Age: 85
End: 2025-06-06

## 2025-06-17 ENCOUNTER — TELEPHONE ENCOUNTER (OUTPATIENT)
Dept: URBAN - METROPOLITAN AREA CLINIC 115 | Facility: CLINIC | Age: 85
End: 2025-06-17

## 2025-07-15 ENCOUNTER — TELEPHONE ENCOUNTER (OUTPATIENT)
Dept: URBAN - METROPOLITAN AREA CLINIC 115 | Facility: CLINIC | Age: 85
End: 2025-07-15

## 2025-07-15 RX ORDER — AMOXICILLIN AND CLAVULANATE POTASSIUM 875; 125 MG/1; MG/1
1 TABLET TABLET, FILM COATED ORAL
Qty: 20 | OUTPATIENT
Start: 2025-07-15 | End: 2025-07-25

## 2025-08-14 ENCOUNTER — OFFICE VISIT (OUTPATIENT)
Dept: URBAN - METROPOLITAN AREA CLINIC 115 | Facility: CLINIC | Age: 85
End: 2025-08-14
Payer: MEDICARE

## 2025-08-14 DIAGNOSIS — K21.9 GERD (GASTROESOPHAGEAL REFLUX DISEASE): ICD-10-CM

## 2025-08-14 DIAGNOSIS — K52.832 LYMPHOCYTIC COLITIS: ICD-10-CM

## 2025-08-14 DIAGNOSIS — R13.10 DYSPHAGIA: ICD-10-CM

## 2025-08-14 DIAGNOSIS — R10.84 GENERALIZED ABDOMINAL PAIN: ICD-10-CM

## 2025-08-14 DIAGNOSIS — K58.0 IRRITABLE BOWEL SYNDROME WITH DIARRHEA: ICD-10-CM

## 2025-08-14 DIAGNOSIS — R14.3 EXCESSIVE GAS: ICD-10-CM

## 2025-08-14 PROCEDURE — 99214 OFFICE O/P EST MOD 30 MIN: CPT | Performed by: INTERNAL MEDICINE

## 2025-08-14 RX ORDER — OMEPRAZOLE 40 MG/1
1 CAPSULE 30 MINUTES BEFORE MORNING MEAL CAPSULE, DELAYED RELEASE ORAL ONCE A DAY
Qty: 90 | Refills: 0 | COMMUNITY
Start: 2020-12-17

## 2025-08-14 RX ORDER — SUCRALFATE 1 G/1
TAKE ONE TABLET BY MOUTH EVERY NIGHT AT BEDTIME TABLET ORAL
Qty: 90 | Refills: 0 | COMMUNITY

## 2025-08-14 RX ORDER — OMEPRAZOLE 40 MG/1
1 CAPSULE 30 MINUTES BEFORE MORNING MEAL CAPSULE, DELAYED RELEASE ORAL ONCE A DAY
Qty: 90 | Refills: 1 | COMMUNITY
Start: 2022-06-16

## 2025-08-14 RX ORDER — ACETAMINOPHEN 325 MG/1
TAKE 1 TABLET (325 MG) BY ORAL ROUTE EVERY 4 HOURS AS NEEDED TABLET, FILM COATED ORAL
Qty: 0 | Refills: 0 | COMMUNITY
Start: 1900-01-01

## 2025-08-14 RX ORDER — OMEPRAZOLE 40 MG/1
1 CAPSULE 30 MINUTES BEFORE MORNING MEAL CAPSULE, DELAYED RELEASE ORAL ONCE A DAY
Qty: 90 | Refills: 1 | COMMUNITY
Start: 2020-07-17

## 2025-08-14 RX ORDER — OMEPRAZOLE 40 MG/1
CAPSULE, DELAYED RELEASE PELLETS ORAL
Qty: 0 | Refills: 0 | Status: ACTIVE | COMMUNITY
Start: 1900-01-01

## 2025-08-14 RX ORDER — DICYCLOMINE HYDROCHLORIDE 10 MG/1
1 TABLET CAPSULE ORAL THREE TIMES A DAY
Qty: 90 | Refills: 1 | OUTPATIENT
Start: 2025-08-14

## 2025-08-14 RX ORDER — ATORVASTATIN CALCIUM 10 MG/1
TAKE 1 TABLET (10 MG) BY ORAL ROUTE ONCE DAILY TABLET, FILM COATED ORAL 1
Qty: 0 | Refills: 0 | Status: ACTIVE | COMMUNITY
Start: 1900-01-01

## 2025-08-14 RX ORDER — ACETAMINOPHEN ER 650 MG TABLET,EXTENDED RELEASE 650 MG
TABLET, EXTENDED RELEASE ORAL
Qty: 0 | Refills: 0 | COMMUNITY
Start: 1900-01-01

## 2025-08-14 RX ORDER — CHOLESTYRAMINE 4 G/9G
1 PACKET MIXED WITH WATER OR NON-CARBONATED DRINK POWDER, FOR SUSPENSION ORAL TWICE A DAY
Qty: 60 | Refills: 1 | COMMUNITY
Start: 2022-06-16

## 2025-08-14 RX ORDER — TRAMADOL HYDROCHLORIDE 50 MG/1
TABLET, FILM COATED ORAL
Qty: 0 | Refills: 0 | COMMUNITY
Start: 1900-01-01

## 2025-08-14 RX ORDER — FEXOFENADINE HCL 180 MG/1
TAKE 2 TABLETS BY MOUTH EVERY MORNING WITH BREAKFAST TABLET ORAL
Qty: 90 EACH | Refills: 0 | COMMUNITY

## 2025-08-14 RX ORDER — CHOLESTYRAMINE 4 G/9G
1 PACKET MIXED WITH WATER OR NON-CARBONATED DRINK POWDER, FOR SUSPENSION ORAL ONCE A DAY
Qty: 90 | Refills: 1 | COMMUNITY
Start: 2023-09-14